# Patient Record
Sex: MALE | Race: BLACK OR AFRICAN AMERICAN | NOT HISPANIC OR LATINO | Employment: OTHER | URBAN - METROPOLITAN AREA
[De-identification: names, ages, dates, MRNs, and addresses within clinical notes are randomized per-mention and may not be internally consistent; named-entity substitution may affect disease eponyms.]

---

## 2023-05-02 LAB
APPEARANCE, URINE: CLEAR
BILIRUBIN, URINE: NEGATIVE
BLOOD, URINE: NEGATIVE
COLOR, URINE: YELLOW
GLUCOSE, URINE: NEGATIVE MG/DL
KETONES, URINE: NEGATIVE MG/DL
LEUKOCYTE ESTERASE, URINE: NEGATIVE
NITRITE, URINE: NEGATIVE
PH, URINE: 6 (ref 5–8)
PROTEIN, URINE: NEGATIVE MG/DL
SPECIFIC GRAVITY, URINE: 1.02 (ref 1–1.03)
UROBILINOGEN, URINE: 2 MG/DL (ref 0–1.9)

## 2023-05-03 LAB — URINE CULTURE: NO GROWTH

## 2024-08-23 DIAGNOSIS — R32 URINARY INCONTINENCE, UNSPECIFIED TYPE: Primary | ICD-10-CM

## 2024-08-26 RX ORDER — TAMSULOSIN HYDROCHLORIDE 0.4 MG/1
CAPSULE ORAL
Qty: 180 CAPSULE | Refills: 3 | Status: SHIPPED | OUTPATIENT
Start: 2024-08-26

## 2024-11-05 ENCOUNTER — HOSPITAL ENCOUNTER (INPATIENT)
Facility: HOSPITAL | Age: 69
LOS: 2 days | Discharge: HOME | End: 2024-11-07
Attending: EMERGENCY MEDICINE | Admitting: NURSE PRACTITIONER
Payer: MEDICARE

## 2024-11-05 ENCOUNTER — APPOINTMENT (OUTPATIENT)
Dept: RADIOLOGY | Facility: HOSPITAL | Age: 69
End: 2024-11-05
Payer: MEDICARE

## 2024-11-05 ENCOUNTER — CLINICAL SUPPORT (OUTPATIENT)
Dept: EMERGENCY MEDICINE | Facility: HOSPITAL | Age: 69
End: 2024-11-05
Payer: MEDICARE

## 2024-11-05 DIAGNOSIS — S52.601A CLOSED FRACTURE OF DISTAL ENDS OF RIGHT RADIUS AND ULNA, INITIAL ENCOUNTER: ICD-10-CM

## 2024-11-05 DIAGNOSIS — S52.501A CLOSED FRACTURE OF LOWER END OF RADIUS WITH ULNA, RIGHT, INITIAL ENCOUNTER: Primary | ICD-10-CM

## 2024-11-05 DIAGNOSIS — S52.601A CLOSED FRACTURE OF LOWER END OF RADIUS WITH ULNA, RIGHT, INITIAL ENCOUNTER: Primary | ICD-10-CM

## 2024-11-05 DIAGNOSIS — S52.501A CLOSED FRACTURE OF DISTAL ENDS OF RIGHT RADIUS AND ULNA, INITIAL ENCOUNTER: ICD-10-CM

## 2024-11-05 DIAGNOSIS — Z72.0 TOBACCO USE: ICD-10-CM

## 2024-11-05 PROBLEM — N40.1 BPH WITH OBSTRUCTION/LOWER URINARY TRACT SYMPTOMS: Status: ACTIVE | Noted: 2024-11-05

## 2024-11-05 PROBLEM — N13.8 BPH WITH OBSTRUCTION/LOWER URINARY TRACT SYMPTOMS: Status: ACTIVE | Noted: 2024-11-05

## 2024-11-05 LAB
ABO GROUP (TYPE) IN BLOOD: NORMAL
AMPHETAMINES UR QL SCN: ABNORMAL
ANION GAP SERPL CALC-SCNC: 18 MMOL/L (ref 10–20)
ANTIBODY SCREEN: NORMAL
APTT PPP: 27 SECONDS (ref 27–38)
ATRIAL RATE: 82 BPM
BARBITURATES UR QL SCN: ABNORMAL
BENZODIAZ UR QL SCN: ABNORMAL
BUN SERPL-MCNC: 16 MG/DL (ref 6–23)
BZE UR QL SCN: ABNORMAL
CALCIUM SERPL-MCNC: 8.8 MG/DL (ref 8.6–10.6)
CANNABINOIDS UR QL SCN: ABNORMAL
CHLORIDE SERPL-SCNC: 106 MMOL/L (ref 98–107)
CO2 SERPL-SCNC: 21 MMOL/L (ref 21–32)
CREAT SERPL-MCNC: 0.65 MG/DL (ref 0.5–1.3)
EGFRCR SERPLBLD CKD-EPI 2021: >90 ML/MIN/1.73M*2
ERYTHROCYTE [DISTWIDTH] IN BLOOD BY AUTOMATED COUNT: 14.6 % (ref 11.5–14.5)
ETHANOL SERPL-MCNC: <10 MG/DL
FENTANYL+NORFENTANYL UR QL SCN: ABNORMAL
GLUCOSE SERPL-MCNC: 155 MG/DL (ref 74–99)
HCT VFR BLD AUTO: 39.2 % (ref 41–52)
HGB BLD-MCNC: 13.4 G/DL (ref 13.5–17.5)
INR PPP: 0.9 (ref 0.9–1.1)
MCH RBC QN AUTO: 29.4 PG (ref 26–34)
MCHC RBC AUTO-ENTMCNC: 34.2 G/DL (ref 32–36)
MCV RBC AUTO: 86 FL (ref 80–100)
METHADONE UR QL SCN: ABNORMAL
NRBC BLD-RTO: 0 /100 WBCS (ref 0–0)
OPIATES UR QL SCN: ABNORMAL
OXYCODONE+OXYMORPHONE UR QL SCN: ABNORMAL
P AXIS: 62 DEGREES
P OFFSET: 202 MS
P ONSET: 137 MS
PCP UR QL SCN: ABNORMAL
PLATELET # BLD AUTO: 268 X10*3/UL (ref 150–450)
POTASSIUM SERPL-SCNC: 4.1 MMOL/L (ref 3.5–5.3)
PR INTERVAL: 142 MS
PROTHROMBIN TIME: 10.2 SECONDS (ref 9.8–12.8)
Q ONSET: 208 MS
QRS COUNT: 14 BEATS
QRS DURATION: 100 MS
QT INTERVAL: 344 MS
QTC CALCULATION(BAZETT): 401 MS
QTC FREDERICIA: 381 MS
R AXIS: -25 DEGREES
RBC # BLD AUTO: 4.56 X10*6/UL (ref 4.5–5.9)
RH FACTOR (ANTIGEN D): NORMAL
SODIUM SERPL-SCNC: 141 MMOL/L (ref 136–145)
T AXIS: 57 DEGREES
T OFFSET: 380 MS
TSH SERPL-ACNC: 1.65 MIU/L (ref 0.44–3.98)
VENTRICULAR RATE: 82 BPM
WBC # BLD AUTO: 7.5 X10*3/UL (ref 4.4–11.3)

## 2024-11-05 PROCEDURE — 72125 CT NECK SPINE W/O DYE: CPT | Performed by: RADIOLOGY

## 2024-11-05 PROCEDURE — 36415 COLL VENOUS BLD VENIPUNCTURE: CPT

## 2024-11-05 PROCEDURE — 2500000001 HC RX 250 WO HCPCS SELF ADMINISTERED DRUGS (ALT 637 FOR MEDICARE OP): Performed by: NURSE PRACTITIONER

## 2024-11-05 PROCEDURE — 96374 THER/PROPH/DIAG INJ IV PUSH: CPT

## 2024-11-05 PROCEDURE — 71045 X-RAY EXAM CHEST 1 VIEW: CPT

## 2024-11-05 PROCEDURE — 1210000001 HC SEMI-PRIVATE ROOM DAILY

## 2024-11-05 PROCEDURE — 85610 PROTHROMBIN TIME: CPT

## 2024-11-05 PROCEDURE — 96375 TX/PRO/DX INJ NEW DRUG ADDON: CPT

## 2024-11-05 PROCEDURE — 80048 BASIC METABOLIC PNL TOTAL CA: CPT

## 2024-11-05 PROCEDURE — 2500000004 HC RX 250 GENERAL PHARMACY W/ HCPCS (ALT 636 FOR OP/ED)

## 2024-11-05 PROCEDURE — 72125 CT NECK SPINE W/O DYE: CPT

## 2024-11-05 PROCEDURE — 85027 COMPLETE CBC AUTOMATED: CPT

## 2024-11-05 PROCEDURE — 70450 CT HEAD/BRAIN W/O DYE: CPT | Performed by: RADIOLOGY

## 2024-11-05 PROCEDURE — 73090 X-RAY EXAM OF FOREARM: CPT | Mod: RIGHT SIDE | Performed by: RADIOLOGY

## 2024-11-05 PROCEDURE — 80307 DRUG TEST PRSMV CHEM ANLYZR: CPT

## 2024-11-05 PROCEDURE — G0378 HOSPITAL OBSERVATION PER HR: HCPCS

## 2024-11-05 PROCEDURE — 99223 1ST HOSP IP/OBS HIGH 75: CPT | Performed by: NURSE PRACTITIONER

## 2024-11-05 PROCEDURE — 84443 ASSAY THYROID STIM HORMONE: CPT | Performed by: NURSE PRACTITIONER

## 2024-11-05 PROCEDURE — 93010 ELECTROCARDIOGRAM REPORT: CPT

## 2024-11-05 PROCEDURE — 73090 X-RAY EXAM OF FOREARM: CPT | Mod: RT

## 2024-11-05 PROCEDURE — 2500000002 HC RX 250 W HCPCS SELF ADMINISTERED DRUGS (ALT 637 FOR MEDICARE OP, ALT 636 FOR OP/ED): Performed by: NURSE PRACTITIONER

## 2024-11-05 PROCEDURE — 82077 ASSAY SPEC XCP UR&BREATH IA: CPT | Performed by: NURSE PRACTITIONER

## 2024-11-05 PROCEDURE — 73070 X-RAY EXAM OF ELBOW: CPT | Mod: RIGHT SIDE | Performed by: RADIOLOGY

## 2024-11-05 PROCEDURE — 93005 ELECTROCARDIOGRAM TRACING: CPT

## 2024-11-05 PROCEDURE — 73100 X-RAY EXAM OF WRIST: CPT | Mod: RIGHT SIDE | Performed by: RADIOLOGY

## 2024-11-05 PROCEDURE — 73070 X-RAY EXAM OF ELBOW: CPT | Mod: RT

## 2024-11-05 PROCEDURE — 70450 CT HEAD/BRAIN W/O DYE: CPT

## 2024-11-05 PROCEDURE — 86901 BLOOD TYPING SEROLOGIC RH(D): CPT

## 2024-11-05 PROCEDURE — 99285 EMERGENCY DEPT VISIT HI MDM: CPT | Performed by: EMERGENCY MEDICINE

## 2024-11-05 PROCEDURE — 73100 X-RAY EXAM OF WRIST: CPT | Mod: RT

## 2024-11-05 PROCEDURE — 99285 EMERGENCY DEPT VISIT HI MDM: CPT | Mod: 25

## 2024-11-05 RX ORDER — FENTANYL CITRATE 50 UG/ML
100 INJECTION, SOLUTION INTRAMUSCULAR; INTRAVENOUS ONCE
Status: COMPLETED | OUTPATIENT
Start: 2024-11-05 | End: 2024-11-05

## 2024-11-05 RX ORDER — AMOXICILLIN 250 MG
2 CAPSULE ORAL 2 TIMES DAILY
Status: DISCONTINUED | OUTPATIENT
Start: 2024-11-05 | End: 2024-11-07 | Stop reason: HOSPADM

## 2024-11-05 RX ORDER — OXYCODONE HYDROCHLORIDE 5 MG/1
5 TABLET ORAL EVERY 6 HOURS PRN
Status: DISCONTINUED | OUTPATIENT
Start: 2024-11-05 | End: 2024-11-05

## 2024-11-05 RX ORDER — TAMSULOSIN HYDROCHLORIDE 0.4 MG/1
0.8 CAPSULE ORAL NIGHTLY
Status: DISCONTINUED | OUTPATIENT
Start: 2024-11-05 | End: 2024-11-07 | Stop reason: HOSPADM

## 2024-11-05 RX ORDER — LIDOCAINE HYDROCHLORIDE AND EPINEPHRINE 10; 10 MG/ML; UG/ML
INJECTION, SOLUTION INFILTRATION; PERINEURAL
Status: DISPENSED
Start: 2024-11-05 | End: 2024-11-06

## 2024-11-05 RX ORDER — OXYCODONE HYDROCHLORIDE 5 MG/1
10 TABLET ORAL EVERY 6 HOURS PRN
Status: DISCONTINUED | OUTPATIENT
Start: 2024-11-05 | End: 2024-11-07 | Stop reason: HOSPADM

## 2024-11-05 RX ORDER — FINASTERIDE 5 MG/1
5 TABLET, FILM COATED ORAL DAILY
COMMUNITY

## 2024-11-05 RX ORDER — IBUPROFEN 200 MG
1 TABLET ORAL DAILY PRN
Status: DISCONTINUED | OUTPATIENT
Start: 2024-11-05 | End: 2024-11-07 | Stop reason: HOSPADM

## 2024-11-05 RX ORDER — FINASTERIDE 5 MG/1
5 TABLET, FILM COATED ORAL DAILY
Status: DISCONTINUED | OUTPATIENT
Start: 2024-11-05 | End: 2024-11-07 | Stop reason: HOSPADM

## 2024-11-05 RX ORDER — SILDENAFIL 100 MG/1
100 TABLET, FILM COATED ORAL AS NEEDED
COMMUNITY

## 2024-11-05 RX ORDER — FINASTERIDE 5 MG/1
5 TABLET, FILM COATED ORAL DAILY
Status: DISCONTINUED | OUTPATIENT
Start: 2024-11-06 | End: 2024-11-05

## 2024-11-05 RX ORDER — AMLODIPINE BESYLATE 5 MG/1
5 TABLET ORAL DAILY
Status: DISCONTINUED | OUTPATIENT
Start: 2024-11-05 | End: 2024-11-05

## 2024-11-05 RX ORDER — ACETAMINOPHEN 325 MG/1
650 TABLET ORAL EVERY 6 HOURS PRN
Status: DISCONTINUED | OUTPATIENT
Start: 2024-11-05 | End: 2024-11-07 | Stop reason: HOSPADM

## 2024-11-05 RX ORDER — MORPHINE SULFATE 4 MG/ML
2 INJECTION INTRAVENOUS ONCE
Status: COMPLETED | OUTPATIENT
Start: 2024-11-05 | End: 2024-11-05

## 2024-11-05 RX ORDER — FENTANYL CITRATE 50 UG/ML
INJECTION, SOLUTION INTRAMUSCULAR; INTRAVENOUS
Status: COMPLETED
Start: 2024-11-05 | End: 2024-11-05

## 2024-11-05 RX ORDER — OXYCODONE HYDROCHLORIDE 5 MG/1
10 TABLET ORAL EVERY 6 HOURS PRN
Status: DISCONTINUED | OUTPATIENT
Start: 2024-11-05 | End: 2024-11-05

## 2024-11-05 RX ORDER — AMLODIPINE BESYLATE 5 MG/1
5 TABLET ORAL DAILY
Status: DISCONTINUED | OUTPATIENT
Start: 2024-11-06 | End: 2024-11-07 | Stop reason: HOSPADM

## 2024-11-05 RX ORDER — LIDOCAINE HYDROCHLORIDE 10 MG/ML
20 INJECTION, SOLUTION INFILTRATION; PERINEURAL ONCE
Status: COMPLETED | OUTPATIENT
Start: 2024-11-05 | End: 2024-11-05

## 2024-11-05 RX ORDER — ACETAMINOPHEN 500 MG
5 TABLET ORAL NIGHTLY PRN
Status: DISCONTINUED | OUTPATIENT
Start: 2024-11-05 | End: 2024-11-07 | Stop reason: HOSPADM

## 2024-11-05 RX ORDER — OXYCODONE HYDROCHLORIDE 5 MG/1
5 TABLET ORAL EVERY 6 HOURS PRN
Status: DISCONTINUED | OUTPATIENT
Start: 2024-11-05 | End: 2024-11-07 | Stop reason: HOSPADM

## 2024-11-05 ASSESSMENT — ENCOUNTER SYMPTOMS
WEAKNESS: 0
NAUSEA: 0
VOMITING: 0
DIFFICULTY URINATING: 0
SHORTNESS OF BREATH: 0
CHILLS: 0
ABDOMINAL PAIN: 0
HEADACHES: 0
COUGH: 0
DIARRHEA: 0
CONFUSION: 0
FEVER: 0
DIZZINESS: 0

## 2024-11-05 ASSESSMENT — PAIN - FUNCTIONAL ASSESSMENT
PAIN_FUNCTIONAL_ASSESSMENT: 0-10

## 2024-11-05 ASSESSMENT — PAIN SCALES - GENERAL
PAINLEVEL_OUTOF10: 10 - WORST POSSIBLE PAIN
PAINLEVEL_OUTOF10: 6
PAINLEVEL_OUTOF10: 3
PAINLEVEL_OUTOF10: 4
PAINLEVEL_OUTOF10: 8

## 2024-11-05 ASSESSMENT — PAIN DESCRIPTION - PROGRESSION
CLINICAL_PROGRESSION: NOT CHANGED
CLINICAL_PROGRESSION: RAPIDLY IMPROVING

## 2024-11-05 ASSESSMENT — PAIN DESCRIPTION - ORIENTATION: ORIENTATION: RIGHT

## 2024-11-05 ASSESSMENT — PAIN DESCRIPTION - LOCATION: LOCATION: ARM

## 2024-11-05 NOTE — H&P
History Of Present Illness  Grover Bauer is a 69 y.o. male with a PMH of cocaine abuse, tobacco use disorder, and BPH. Mr. Bauer presented to ED via EMS for further evaluation after he fell 12 ft off a ladder. Pt had noted deformity of right forearm with abrasions present on his right lower leg and abdomen. Splint was placed to right arm in field by EMS. He also endorses cocaine use yesterday. Pt resting comfortably when seen by writer in ED. Pt told writer that he feels much better than he did upon arrival to ED. He currently endorses 2/10 pain in right arm which is tolerable per his report. Labs obtained on admit notable for mild anemia. Imaging obtained while in the ED showed midshaft both-bone forearm fracture with displacement and overriding. 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 Ortho consulted and pt seen by consulting team while in ED. Plan is for pt to go OR in AM for surgical intervention. Pt admitted to medicine service for closed fracture of lower end of radius with ulna.    Past Medical History  Past Medical History:   Diagnosis Date    BPH with obstruction/lower urinary tract symptoms     Cocaine abuse        Surgical History  Past Surgical History:   Procedure Laterality Date    OTHER SURGICAL HISTORY  01/13/2015    Needle Biopsy Of Prostate        Social History  He reports that he has been smoking cigarettes. He has never used smokeless tobacco. He reports that he does not currently use alcohol. He reports current drug use. Drug: Cocaine.    Family History  Family History   Family history unknown: Yes        Allergies  Patient has no known allergies.    Review of Systems   Constitutional:  Negative for chills and fever.   Respiratory:  Negative for cough and shortness of breath.    Cardiovascular:  Negative for chest pain and leg  swelling.   Gastrointestinal:  Negative for abdominal pain, diarrhea, nausea and vomiting.   Genitourinary:  Negative for difficulty urinating.   Musculoskeletal:         Right arm pain   Neurological:  Negative for dizziness, weakness and headaches.   Psychiatric/Behavioral:  Negative for confusion.         Physical Exam  Vitals reviewed.   Constitutional:       General: He is not in acute distress.  HENT:      Head: Normocephalic.      Nose: Nose normal.      Mouth/Throat:      Mouth: Mucous membranes are dry.      Comments: Poor dentition  Eyes:      Extraocular Movements: Extraocular movements intact.   Cardiovascular:      Rate and Rhythm: Normal rate.      Pulses: Normal pulses.      Heart sounds: Normal heart sounds.   Pulmonary:      Effort: Pulmonary effort is normal. No respiratory distress.      Breath sounds: Normal breath sounds.   Abdominal:      General: Bowel sounds are normal.      Palpations: Abdomen is soft.   Musculoskeletal:      Cervical back: Normal range of motion.      Comments: Splint on R arm (fingers warm to touch, able to wiggle fingers)   Skin:     General: Skin is warm and dry.   Neurological:      Mental Status: He is alert and oriented to person, place, and time.          Last Recorded Vitals  Blood pressure (!) 143/94, pulse 88, weight 109 kg (240 lb), SpO2 96%.    Relevant Results  CT cervical spine wo IV contrast    Result Date: 11/5/2024  Interpreted By:  Carine Noyola and Sheng Max STUDY: CT HEAD WO IV CONTRAST; CT CERVICAL SPINE WO IV CONTRAST;  11/5/2024 1:36 pm   INDICATION: Signs/Symptoms:fall. Fell off [employer] ladder. No LOC. No head or neck pain.     COMPARISON: CT head and cervical spine 06/20/2023 from outside hospital, report available images not available.   ACCESSION NUMBER(S): [id number]; [id number]   ORDERING CLINICIAN: CLEM PETIT   TECHNIQUE: Axial noncontrast CT images of head with coronal and sagittal reconstructed images. Axial noncontrast CT images of  the cervical spine with coronal and sagittal reconstructed images.   FINDINGS: CT HEAD:   BRAIN PARENCHYMA: No evidence of acute intraparenchymal hemorrhage or parenchymal evidence of acute large territory ischemic infarct. No mass-effect, midline shift or effacement of cerebral sulci. Gray-white matter distinction is preserved. Mild hypoattenuation in the bilateral periventricular white matter and centrum semiovale are non-specific may be sequela of chronic small-vessel ischemic changes given patient's age.   VENTRICLES and EXTRA-AXIAL SPACES: No acute extra-axial or intraventricular hemorrhage. Ventricles and sulci are age-concordant.   PARANASAL SINUSES/MASTOIDS: Mild mucosal thickening in the bilateral anterior ethmoid air cells and right maxillary sinus. The remaining paranasal sinuses and mastoid air cells are well-aerated. Anatomic variant of torus palatini.   CALVARIUM/ORBITS: No skull fracture. The orbits and globes are intact to the extent visualized.   EXTRACRANIAL SOFT TISSUES: No discernible abnormality.     CT Cervical Spine:   PREVERTEBRAL SOFT TISSUES: Within normal limits.   CRANIOCERVICAL JUNCTION: Intact.   ALIGNMENT: No traumatic malalignment or traumatic facet widening.   VERTEBRAE: No acute fracture. Vertebral body heights are maintained.   SPINAL CANAL/INTERVERTEBRAL DISCS: No high-grade spinal canal stenosis. No significant disc height loss.   NEURAL FORAMINA: No significant neural foraminal stenosis.   OTHER: Calcifications in the right palatine tonsil may be secondary to remote infection/inflammation. There is heterogenous enlargement of the thyroid gland which can be seen in multinodular goiter. Minimal pleural-parenchymal scarring in the bilateral lung apices.   Fat density mass within the posterior neck on the right measuring 6.7 x 4.0 cm likely represents a lipoma.       CT HEAD: 1. No acute intracranial hemorrhage or depressed calvarial fracture. 2. Mild white matter hypodensity  compatible with microangiopathy.     CT CERVICAL SPINE: 1. No acute fracture or traumatic malalignment of the cervical spine. 2. Heterogenous enlargement of the thyroid gland. Further evaluation with non-emergent thyroid ultrasound on an outpatient basis may be obtained as per clinical need. 3. Fat density mass within the posterior neck on the right likely represents a lipoma measuring 6.7 x 4.0 cm.     I personally reviewed the images/study and I agree with the findings as stated by Dr. Rubens Quach. This study was interpreted at [place], [place].   MACRO: None.   Signed by: Carine Noyola 11/5/2024 2:00 PM Dictation workstation:   [id number]    CT head wo IV contrast    Result Date: 11/5/2024  Interpreted By:  Carine Noyola and Sheng Max STUDY: CT HEAD WO IV CONTRAST; CT CERVICAL SPINE WO IV CONTRAST;  11/5/2024 1:36 pm   INDICATION: Signs/Symptoms:fall. Fell off [employer]elfa ladder. No LOC. No head or neck pain.     COMPARISON: CT head and cervical spine 06/20/2023 from outside hospital, report available images not available.   ACCESSION NUMBER(S): [id number]; [id number]   ORDERING CLINICIAN: CLEM PETIT   TECHNIQUE: Axial noncontrast CT images of head with coronal and sagittal reconstructed images. Axial noncontrast CT images of the cervical spine with coronal and sagittal reconstructed images.   FINDINGS: CT HEAD:   BRAIN PARENCHYMA: No evidence of acute intraparenchymal hemorrhage or parenchymal evidence of acute large territory ischemic infarct. No mass-effect, midline shift or effacement of cerebral sulci. Gray-white matter distinction is preserved. Mild hypoattenuation in the bilateral periventricular white matter and centrum semiovale are non-specific may be sequela of chronic small-vessel ischemic changes given patient's age.   VENTRICLES and EXTRA-AXIAL SPACES: No acute extra-axial or intraventricular hemorrhage. Ventricles and sulci are age-concordant.    PARANASAL SINUSES/MASTOIDS: Mild mucosal thickening in the bilateral anterior ethmoid air cells and right maxillary sinus. The remaining paranasal sinuses and mastoid air cells are well-aerated. Anatomic variant of torus palatini.   CALVARIUM/ORBITS: No skull fracture. The orbits and globes are intact to the extent visualized.   EXTRACRANIAL SOFT TISSUES: No discernible abnormality.     CT Cervical Spine:   PREVERTEBRAL SOFT TISSUES: Within normal limits.   CRANIOCERVICAL JUNCTION: Intact.   ALIGNMENT: No traumatic malalignment or traumatic facet widening.   VERTEBRAE: No acute fracture. Vertebral body heights are maintained.   SPINAL CANAL/INTERVERTEBRAL DISCS: No high-grade spinal canal stenosis. No significant disc height loss.   NEURAL FORAMINA: No significant neural foraminal stenosis.   OTHER: Calcifications in the right palatine tonsil may be secondary to remote infection/inflammation. There is heterogenous enlargement of the thyroid gland which can be seen in multinodular goiter. Minimal pleural-parenchymal scarring in the bilateral lung apices.   Fat density mass within the posterior neck on the right measuring 6.7 x 4.0 cm likely represents a lipoma.       CT HEAD: 1. No acute intracranial hemorrhage or depressed calvarial fracture. 2. Mild white matter hypodensity compatible with microangiopathy.     CT CERVICAL SPINE: 1. No acute fracture or traumatic malalignment of the cervical spine. 2. Heterogenous enlargement of the thyroid gland. Further evaluation with non-emergent thyroid ultrasound on an outpatient basis may be obtained as per clinical need. 3. Fat density mass within the posterior neck on the right likely represents a lipoma measuring 6.7 x 4.0 cm.     I personally reviewed the images/study and I agree with the findings as stated by Dr. Rubens Quach. This study was interpreted at [facility], [place], [place].   MACRO: None.   Signed by: Carine Noyola 11/5/2024  2:00 PM Dictation workstation:   [id number]    XR chest 1 view    Result Date: 11/5/2024  Interpreted By:  Vinicius Farmer, STUDY: XR CHEST 1 VIEW   INDICATION: Signs/Symptoms:pre-op.   COMPARISON: None   ACCESSION NUMBER(S): [id number]   ORDERING CLINICIAN: CLEM PETIT   FINDINGS:     No consolidation, effusion, edema, or pneumothorax. Heart size within normal limits given supine AP positioning.         No evidence of acute intrathoracic abnormality.   Signed by: Vinicius Farmer 11/5/2024 1:40 PM Dictation workstation:   [id number]    XR forearm right 2 views    Result Date: 11/5/2024  Interpreted By:  Vinicius Farmer, STUDY: XR FOREARM RIGHT 2 VIEWS; XR ELBOW RIGHT 1-2 VIEWS; XR WRIST RIGHT 1-2 VIEWS   INDICATION: Signs/Symptoms:fall.   COMPARISON: None   ACCESSION NUMBER(S): [id number]; [id number]; [id number]   ORDERING CLINICIAN: CLEM PETIT   FINDINGS: Displaced and overriding midshaft both-bone forearm fracture right forearm.   Additional nondisplaced fracture right distal ulnar diaphysis.   Carpal alignment normal.   No acute findings of the elbow.         Midshaft both-bone forearm fracture with displacement and overriding.   Additional nondisplaced distal ulnar diaphyseal fracture.   Signed by: Vinicius Farmer 11/5/2024 1:39 PM Dictation workstation:   [id number]    XR elbow right 1-2 views    Result Date: 11/5/2024  Interpreted By:  Vinicius Farmer, STUDY: XR FOREARM RIGHT 2 VIEWS; XR ELBOW RIGHT 1-2 VIEWS; XR WRIST RIGHT 1-2 VIEWS   INDICATION: Signs/Symptoms:fall.   COMPARISON: None   ACCESSION NUMBER(S): [id number]; [id number]; [id number]   ORDERING CLINICIAN: CLEM PETIT   FINDINGS: Displaced and overriding midshaft both-bone forearm fracture right forearm.   Additional nondisplaced fracture right distal ulnar diaphysis.   Carpal alignment normal.   No acute findings of the elbow.         Midshaft both-bone forearm fracture with displacement and overriding.   Additional nondisplaced distal ulnar  diaphyseal fracture.   Signed by: Vinicius Farmer 11/5/2024 1:39 PM Dictation workstation:   [id number]    XR wrist right 1-2 views    Result Date: 11/5/2024  Interpreted By:  Vinicius Farmer, STUDY: XR FOREARM RIGHT 2 VIEWS; XR ELBOW RIGHT 1-2 VIEWS; XR WRIST RIGHT 1-2 VIEWS   INDICATION: Signs/Symptoms:fall.   COMPARISON: None   ACCESSION NUMBER(S): [id number]; [id number]; [id number]   ORDERING CLINICIAN: CLEM PETIT   FINDINGS: Displaced and overriding midshaft both-bone forearm fracture right forearm.   Additional nondisplaced fracture right distal ulnar diaphysis.   Carpal alignment normal.   No acute findings of the elbow.         Midshaft both-bone forearm fracture with displacement and overriding.   Additional nondisplaced distal ulnar diaphyseal fracture.   Signed by: Vinicius Farmer 11/5/2024 1:39 PM Dictation workstation:   [id number]      Results for orders placed or performed during the hospital encounter of 11/05/24 (from the past 24 hours)   CBC   Result Value Ref Range    WBC 7.5 4.4 - 11.3 x10*3/uL    nRBC 0.0 0.0 - 0.0 /100 WBCs    RBC 4.56 4.50 - 5.90 x10*6/uL    Hemoglobin 13.4 (L) 13.5 - 17.5 g/dL    Hematocrit 39.2 (L) 41.0 - 52.0 %    MCV 86 80 - 100 fL    MCH 29.4 26.0 - 34.0 pg    MCHC 34.2 32.0 - 36.0 g/dL    RDW 14.6 (H) 11.5 - 14.5 %    Platelets 268 150 - 450 x10*3/uL   Basic metabolic panel   Result Value Ref Range    Glucose 155 (H) 74 - 99 mg/dL    Sodium 141 136 - 145 mmol/L    Potassium 4.1 3.5 - 5.3 mmol/L    Chloride 106 98 - 107 mmol/L    Bicarbonate 21 21 - 32 mmol/L    Anion Gap 18 10 - 20 mmol/L    Urea Nitrogen 16 6 - 23 mg/dL    Creatinine 0.65 0.50 - 1.30 mg/dL    eGFR >90 >60 mL/min/1.73m*2    Calcium 8.8 8.6 - 10.6 mg/dL   Coagulation Screen   Result Value Ref Range    Protime 10.2 9.8 - 12.8 seconds    INR 0.9 0.9 - 1.1    aPTT 27 27 - 38 seconds   Type and Screen   Result Value Ref Range    ABO TYPE O     Rh TYPE POS     ANTIBODY SCREEN NEG         ED Medication  Administration from 11/05/2024 1147 to 11/05/2024 1700         Date/Time Order Dose Route Action Action by     11/05/2024 1218 EST fentaNYL PF (Sublimaze) injection 100 mcg 100 mcg intravenous Given NATALIIA Underwood     11/05/2024 1518 EST morphine injection 2 mg 2 mg intravenous Given NATALIIA Underwood           Scheduled medications  [START ON 11/6/2024] finasteride, 5 mg, oral, Daily  lidocaine, 20 mL, subcutaneous, Once  lidocaine-epinephrine, , ,   sennosides-docusate sodium, 2 tablet, oral, BID  tamsulosin, 0.8 mg, oral, Nightly      Continuous medications     PRN medications  PRN medications: acetaminophen, lidocaine-epinephrine, melatonin, oxyCODONE, oxyCODONE       Assessment/Plan     #closed fracture of lower end of radius with ulna  - Xray imaging of R wrist/elbow/forearm: Midshaft both-bone forearm fracture with displacement and overriding.  Additional nondisplaced distal ulnar diaphyseal fracture.  - ortho consulted appreciate recs  - Pre-op labs/tests ordered: CXR, EKG, CBC, Coag screen, BMP, T&S  - Pain regimen: Tylenol 650 mg q6h PRN mild pain, Oxycodone 5 mg q6h PRN moderate pain, and Oxycodone 10 mg PO q6h PRN severe pain  - Bowel regimen while receiving narcotics: Senokot S 2 tabs PO BID  - falls precautions  - PT/OT consulted  - NPO at MN 11/6 except meds with sips of water for surgical intervention with ortho in AM  Surgical Risk Assessment:  1) Patient doesn't have contraindications to elective surgery.  2) Surgery-Specific Cardiac Risk: Low risk (<1% )   3) Patient-Specific Cardiac Risk:  RCRI 0 points class I risk, 3.9% 30-day risk of major adverse cardiac event. This also corresponds to a low-risk of major perioperative cardiovascular events.  4) The 2014 ACC/AHA algorithm was followed and Labs and imaging studies have been considered as indicated by history and physical.  The procedure, alternative treatments, risks, and benefits were explained to the patient. The risk of MACE in the perioperative period  was explained to the patient.  Chest x-ray without any evidence of pulmonary edema or focal consolidations.  No evidence of coagulopathy on coagulation panel.This patient is low risk for a low risk procedure and is at this time medically optimized for surgical intervention.    #abnormal CT finding  - CTH/c-spine:heterogenous enlargement of the thyroid gland. Further evaluation with non-emergent thyroid ultrasound on an outpatient basis may be obtained as per clinical need. Fat density mass within the posterior neck on the right likely represents a lipoma measuring 6.7 x 4.0 cm.  - TSH with reflex to free T4 pending result  - pt will need to US of thyroid scheduled on discharge and follow-up with PCP    #HTN  - no home meds  - BP on admit 160/94; last /94  - elevation may be 2/2 pain or cocaine use  - start low dose Norvasc 5 mg PO every day    #BPH with LUTS  - c/w home dose of Proscar 5 mg PO every day and Flomax 0.8 mg PO at bedtime  - pt will need refill on Proscar at discharge; pt reports he was told by urology that he needs to schedule follow up appt before refill can be given    #cocaine abuse  #nicotine use disorder  - Utox pending collection  - Utox + cocaine and marijuana on 6/21/23  - smoking and cocaine cessation advised and recommended  - Nicotine patch ordered PRN    Dispo: admit to RNF. Plan per above.      I spent 75 minutes in the professional and overall care of this patient.      Alicia Mccollum, APRN-CNP

## 2024-11-05 NOTE — ED PROVIDER NOTES
History of Present Illness     History provided by: Patient  Limitations to History: None  External Records Reviewed with Brief Summary: None    HPI:  Grover Bauer is a 69 y.o. male with pmh of BPH who presents after falling from a ladder. Patient reports he was on a 12 foot ladder when he fell back bracing himself with his right forearm. There was a splint applied on on the field. There is no concern for an open fracture. Patient denies head strike or loss of consciousness. Patient arrived via EMS.     Physical Exam   Triage vitals:  T    HR 88  BP (!) 160/94  RR    O2 97 % None (Room air)    Physical Exam  HENT:      Head: Normocephalic and atraumatic.   Neck:      Comments: Cervical collar in place  Cardiovascular:      Rate and Rhythm: Normal rate and regular rhythm.   Pulmonary:      Effort: Pulmonary effort is normal.      Breath sounds: Normal breath sounds.   Abdominal:      Palpations: Abdomen is soft.   Musculoskeletal:         General: Signs of injury present.      Right forearm: Swelling, deformity, tenderness and bony tenderness present. No lacerations.      Left forearm: Normal.      Cervical back: Normal. No deformity or tenderness.      Thoracic back: Normal. No deformity or tenderness.      Lumbar back: Normal. No deformity or tenderness.      Comments: Right forearm has an obvious deformity. No open wounds or lacerations. 4/5 Strength AIN, PIN, Ulnar. Sensation intact diffusely throughout the fingers. 2+ radial pulse.    Skin:     General: Skin is warm and dry.   Neurological:      General: No focal deficit present.      Mental Status: He is alert and oriented to person, place, and time.          Medical Decision Making & ED Course   Medical Decision Makin y.o. male who sustained a fall from a ladder now presenting with right forearm pain and deformity. With patient falling from height CT head and C spine were ordered there was no concern for headstrike or LoC. CT Head and C-spine  demonstrated no acute pathologies but patient was found to have an enlarged thyroid and what appeared to be a lipoma at the posterior neck.  ere was no concern for headstrike or LoC. Radiographs demonstrate a right forearm both bone forearm fracture. Orthopedics consulted and they recommended admission for operative management. Patient admits to drug use with past use of cocaine yesterday with this information orthopedics requested an admission to medicine.  ----      Differential diagnoses considered include but are not limited to: fracture, dislocation, compartment syndrome,      Social Determinants of Health which Significantly Impact Care: Substance use disorder     EKG Independent Interpretation: EKG interpreted by myself. Please see ED Course for full interpretation.    Independent Result Review and Interpretation: Relevant laboratory and radiographic results were reviewed and independently interpreted by myself.  As necessary, they are commented on in the ED Course.    Chronic conditions affecting the patient's care: As documented above in [facility]    The patient was discussed with the following consultants/services: Orthopedic surgery regarding operative management     Care Considerations: As documented above in [facility]    ED Course:  Diagnoses as of 11/05/24 1700   Closed fracture of lower end of radius with ulna, right, initial encounter     Disposition   As a result of their workup, the patient will require admission to the hospital.  The patient was informed of his diagnosis.  The patient was given the opportunity to ask questions and I answered them. The patient agreed to be admitted to the hospital.    Procedures   Procedures    Patient seen and discussed with ED attending physician.    Kath Dia MD  Emergency Medicine     Kath Dia MD  Resident  11/05/24 1700

## 2024-11-05 NOTE — ED TRIAGE NOTES
PT REPORTS BY [facility] AFTER FALLING 12 FT OFF LADDER. PT DENIES LOC, THINNERS, HEAD, NECK PAIN. PT REPORTS MECHANICAL FALL, HAS NOTED DEFORMITY TO RIGHT FOREARM, ABRASION TO RIGHT LOWER LEG AND ABRASION TO ABD. PT HAS NORMAL PULSE IN AFFECTED ARM, CAP REFILL WNL.

## 2024-11-05 NOTE — CONSULTS
Orthopaedic Surgery Consult H&P    HPI:   Orthopaedic Problems/Injuries: R Both bone forearm fracture  Other Injuries: None    69 y.o. male PMH BPH presents after falling from a ladder onto an outstretched right arm. The patient denies losing consciousness, and notes that his arm was immediately deformed when he looked down. Denies any openings and denies any other injuries. Denies numbness, tingling, and open wounds on the affected limb.     PMH: per above/EMR  PSH: per above/EMR  SocHx:      - Regular tobacco use      - Occasional EtOH use      - Regular cocaine use, last used cocaine 11/4 pm  FamHx:  Non-contributory to this patient's acute orthopaedic problem.   Allergies: Reviewed in EMR  Meds: Reviewed in EMR    ROS      - 14 point ROS negative except as above    Physical Exam:  Gen: AOx3, NAD  HEENT: normocephalic atraumatic  Psych: appropriate mood and affect  Resp: nonlabored breathing  Cardiac: Extremities WWP, RRR to peripheral palpation  Neuro: CN 2-12 grossly intact  Skin: no rashes    Right upper extremity.  RUE:   - Skin intact, gross deformity, notable swelling. Tender at site of injury with painful ROM.  -Motor intact in axillary/AIN/PIN/ulnar distributions  -SILT axillary/radial/median/ulnar distributions  -Hand wwp, 2+ radial pulse, cap refill brisk  -Compartments soft and compressible, no pain with passive stretch of digits      A full secondary exam was performed and all relevant findings discussed and noted above.    Results for orders placed or performed during the hospital encounter of 11/05/24 (from the past 24 hours)   CBC   Result Value Ref Range    WBC 7.5 4.4 - 11.3 x10*3/uL    nRBC 0.0 0.0 - 0.0 /100 WBCs    RBC 4.56 4.50 - 5.90 x10*6/uL    Hemoglobin 13.4 (L) 13.5 - 17.5 g/dL    Hematocrit 39.2 (L) 41.0 - 52.0 %    MCV 86 80 - 100 fL    MCH 29.4 26.0 - 34.0 pg    MCHC 34.2 32.0 - 36.0 g/dL    RDW 14.6 (H) 11.5 - 14.5 %    Platelets 268 150 - 450 x10*3/uL   Basic metabolic panel    Result Value Ref Range    Glucose 155 (H) 74 - 99 mg/dL    Sodium 141 136 - 145 mmol/L    Potassium 4.1 3.5 - 5.3 mmol/L    Chloride 106 98 - 107 mmol/L    Bicarbonate 21 21 - 32 mmol/L    Anion Gap 18 10 - 20 mmol/L    Urea Nitrogen 16 6 - 23 mg/dL    Creatinine 0.65 0.50 - 1.30 mg/dL    eGFR >90 >60 mL/min/1.73m*2    Calcium 8.8 8.6 - 10.6 mg/dL   Coagulation Screen   Result Value Ref Range    Protime 10.2 9.8 - 12.8 seconds    INR 0.9 0.9 - 1.1    aPTT 27 27 - 38 seconds   Type and Screen   Result Value Ref Range    ABO TYPE O     Rh TYPE POS     ANTIBODY SCREEN NEG        XR forearm right 2 views   Final Result        Interval reduction right both-bone forearm fractures. The angulation   is improved but there is still displacement and overriding of both   the radius and ulna.        Nondisplaced distal ulnar diaphyseal fracture not as well seen as on   the earlier radiographs.        Signed by: Vinicius Farmer 11/5/2024 5:02 PM   Dictation workstation:   [id number]      XR elbow right 1-2 views   Final Result        Interval reduction right both-bone forearm fractures. The angulation   is improved but there is still displacement and overriding of both   the radius and ulna.        Nondisplaced distal ulnar diaphyseal fracture not as well seen as on   the earlier radiographs.        Signed by: Vinicius Farmer 11/5/2024 5:02 PM   Dictation workstation:   [id number]      CT cervical spine wo IV contrast   Final Result   CT HEAD:   1. No acute intracranial hemorrhage or depressed calvarial fracture.   2. Mild white matter hypodensity compatible with microangiopathy.             CT CERVICAL SPINE:   1. No acute fracture or traumatic malalignment of the cervical spine.   2. Heterogenous enlargement of the thyroid gland. Further evaluation   with non-emergent thyroid ultrasound on an outpatient basis may be   obtained as per clinical need.   3. Fat density mass within the posterior neck on the right likely   represents a  lipoma measuring 6.7 x 4.0 cm.             I personally reviewed the images/study and I agree with the findings   as stated by Dr. Rubens Quach. This study was interpreted at [place], [place].        MACRO:   None.        Signed by: Carine Noyola 11/5/2024 2:00 PM   Dictation workstation:   [id number]      CT head wo IV contrast   Final Result   CT HEAD:   1. No acute intracranial hemorrhage or depressed calvarial fracture.   2. Mild white matter hypodensity compatible with microangiopathy.             CT CERVICAL SPINE:   1. No acute fracture or traumatic malalignment of the cervical spine.   2. Heterogenous enlargement of the thyroid gland. Further evaluation   with non-emergent thyroid ultrasound on an outpatient basis may be   obtained as per clinical need.   3. Fat density mass within the posterior neck on the right likely   represents a lipoma measuring 6.7 x 4.0 cm.             I personally reviewed the images/study and I agree with the findings   as stated by Dr. Rubens Quach. This study was interpreted at [place], [place].        MACRO:   None.        Signed by: Carine Noyola 11/5/2024 2:00 PM   Dictation workstation:   [id number]      XR forearm right 2 views   Final Result   Midshaft both-bone forearm fracture with displacement and overriding.        Additional nondisplaced distal ulnar diaphyseal fracture.        Signed by: Vinicius Farmer 11/5/2024 1:39 PM   Dictation workstation:   [id number]      XR elbow right 1-2 views   Final Result   Midshaft both-bone forearm fracture with displacement and overriding.        Additional nondisplaced distal ulnar diaphyseal fracture.        Signed by: Vinicius Farmer 11/5/2024 1:39 PM   Dictation workstation:   [id number]      XR wrist right 1-2 views   Final Result   Midshaft both-bone forearm fracture with displacement and overriding.        Additional nondisplaced distal ulnar  diaphyseal fracture.        Signed by: Vinicius Farmer 11/5/2024 1:39 PM   Dictation workstation:   [id number]      XR chest 1 view   Final Result        No evidence of acute intrathoracic abnormality.        Signed by: Vinicius Farmer 11/5/2024 1:40 PM   Dictation workstation:   [id number]          Assessment:  Orthopaedic Problems/Injuries: Right both bone forearm fracture    69M PMH BPH with recent cocaine use, presenting after fall from height sustaining a closed right both bone forearm fracture with significant shortening and angulation. The injury was closed, but given the deformity the patient will require surgical intervention. Plan for ORIF with Dr. Garcia on 11/6/24.    Plan:  - ORIF R BBFFx 11/6/2024 with Dr. Garcia  - Placed in STS  - WB: NWB RUE in STS  - Abx: Perioperative ancef on day of surgery  - Diet: Clear liquid diet at MN, Npo at 0500 for OR on 11/6 with orthopedics  - DVT ppx per primary team protocol  - Please obtain all preop labs (CBC,BMP,EKG, CXR, Coags, Type and Screen)  - [place]aranjo: per primary    - Dispo: Pending OR    Everardo Amezcua MD  On Call Resident - PGY-1 Orthopedic Surgery  [facility]  [employer] [facility] Message Preferred  On-Call Pager [phone]    This patient was seen within 30 minutes of initial consult.  _________________________________________________________    This patient will be followed by the Orthopedic Trauma Team while inpatient. See team members and contacts below:    Ortho Trauma Service (Epic Chat Preferred)  First call: Savanna Heard, PGY-1  Second call: Flavio Anand, PGY-2  Third call: Ivania Manrique, PGY-3    6pm-6am M-F, Holidays, and weekends page Ortho on-call @[phone] with urgent questions/concerns.

## 2024-11-06 ENCOUNTER — ANESTHESIA (OUTPATIENT)
Dept: OPERATING ROOM | Facility: HOSPITAL | Age: 69
End: 2024-11-06
Payer: MEDICARE

## 2024-11-06 ENCOUNTER — APPOINTMENT (OUTPATIENT)
Dept: RADIOLOGY | Facility: HOSPITAL | Age: 69
End: 2024-11-06
Payer: MEDICARE

## 2024-11-06 ENCOUNTER — ANESTHESIA EVENT (OUTPATIENT)
Dept: OPERATING ROOM | Facility: HOSPITAL | Age: 69
End: 2024-11-06
Payer: MEDICARE

## 2024-11-06 LAB
ALBUMIN SERPL BCP-MCNC: 3.5 G/DL (ref 3.4–5)
ANION GAP SERPL CALC-SCNC: 11 MMOL/L (ref 10–20)
APTT PPP: 30 SECONDS (ref 27–38)
BUN SERPL-MCNC: 15 MG/DL (ref 6–23)
CALCIUM SERPL-MCNC: 8.4 MG/DL (ref 8.6–10.6)
CHLORIDE SERPL-SCNC: 108 MMOL/L (ref 98–107)
CO2 SERPL-SCNC: 23 MMOL/L (ref 21–32)
CREAT SERPL-MCNC: 0.67 MG/DL (ref 0.5–1.3)
EGFRCR SERPLBLD CKD-EPI 2021: >90 ML/MIN/1.73M*2
ERYTHROCYTE [DISTWIDTH] IN BLOOD BY AUTOMATED COUNT: 14.7 % (ref 11.5–14.5)
GLUCOSE SERPL-MCNC: 102 MG/DL (ref 74–99)
HCT VFR BLD AUTO: 39.3 % (ref 41–52)
HGB BLD-MCNC: 13.1 G/DL (ref 13.5–17.5)
INR PPP: 1 (ref 0.9–1.1)
MAGNESIUM SERPL-MCNC: 1.85 MG/DL (ref 1.6–2.4)
MCH RBC QN AUTO: 29.8 PG (ref 26–34)
MCHC RBC AUTO-ENTMCNC: 33.3 G/DL (ref 32–36)
MCV RBC AUTO: 89 FL (ref 80–100)
NRBC BLD-RTO: 0 /100 WBCS (ref 0–0)
PHOSPHATE SERPL-MCNC: 3.4 MG/DL (ref 2.5–4.9)
PLATELET # BLD AUTO: 256 X10*3/UL (ref 150–450)
POTASSIUM SERPL-SCNC: 3.8 MMOL/L (ref 3.5–5.3)
PROTHROMBIN TIME: 10.8 SECONDS (ref 9.8–12.8)
RBC # BLD AUTO: 4.4 X10*6/UL (ref 4.5–5.9)
SODIUM SERPL-SCNC: 138 MMOL/L (ref 136–145)
WBC # BLD AUTO: 8.6 X10*3/UL (ref 4.4–11.3)

## 2024-11-06 PROCEDURE — 2500000004 HC RX 250 GENERAL PHARMACY W/ HCPCS (ALT 636 FOR OP/ED): Performed by: ORTHOPAEDIC SURGERY

## 2024-11-06 PROCEDURE — 25575 OPTX RDL&ULN SHFT FX RDS&ULN: CPT | Performed by: ORTHOPAEDIC SURGERY

## 2024-11-06 PROCEDURE — 80069 RENAL FUNCTION PANEL: CPT | Performed by: NURSE PRACTITIONER

## 2024-11-06 PROCEDURE — C1713 ANCHOR/SCREW BN/BN,TIS/BN: HCPCS | Performed by: ORTHOPAEDIC SURGERY

## 2024-11-06 PROCEDURE — 96366 THER/PROPH/DIAG IV INF ADDON: CPT

## 2024-11-06 PROCEDURE — 2500000001 HC RX 250 WO HCPCS SELF ADMINISTERED DRUGS (ALT 637 FOR MEDICARE OP): Performed by: NURSE PRACTITIONER

## 2024-11-06 PROCEDURE — 99232 SBSQ HOSP IP/OBS MODERATE 35: CPT | Performed by: STUDENT IN AN ORGANIZED HEALTH CARE EDUCATION/TRAINING PROGRAM

## 2024-11-06 PROCEDURE — 7100000002 HC RECOVERY ROOM TIME - EACH INCREMENTAL 1 MINUTE: Performed by: ORTHOPAEDIC SURGERY

## 2024-11-06 PROCEDURE — 96365 THER/PROPH/DIAG IV INF INIT: CPT

## 2024-11-06 PROCEDURE — A25575 PR OPEN TX RADIAL AND ULNAR SHAFT FX FIX RADIUS AND ULNA: Performed by: ANESTHESIOLOGY

## 2024-11-06 PROCEDURE — 2500000004 HC RX 250 GENERAL PHARMACY W/ HCPCS (ALT 636 FOR OP/ED): Performed by: ANESTHESIOLOGIST ASSISTANT

## 2024-11-06 PROCEDURE — G0378 HOSPITAL OBSERVATION PER HR: HCPCS

## 2024-11-06 PROCEDURE — 2500000004 HC RX 250 GENERAL PHARMACY W/ HCPCS (ALT 636 FOR OP/ED): Performed by: ANESTHESIOLOGY

## 2024-11-06 PROCEDURE — 2500000002 HC RX 250 W HCPCS SELF ADMINISTERED DRUGS (ALT 637 FOR MEDICARE OP, ALT 636 FOR OP/ED): Performed by: NURSE PRACTITIONER

## 2024-11-06 PROCEDURE — 2500000005 HC RX 250 GENERAL PHARMACY W/O HCPCS: Performed by: ANESTHESIOLOGIST ASSISTANT

## 2024-11-06 PROCEDURE — 3600000003 HC OR TIME - INITIAL BASE CHARGE - PROCEDURE LEVEL THREE: Performed by: ORTHOPAEDIC SURGERY

## 2024-11-06 PROCEDURE — 7100000001 HC RECOVERY ROOM TIME - INITIAL BASE CHARGE: Performed by: ORTHOPAEDIC SURGERY

## 2024-11-06 PROCEDURE — 3700000002 HC GENERAL ANESTHESIA TIME - EACH INCREMENTAL 1 MINUTE: Performed by: ORTHOPAEDIC SURGERY

## 2024-11-06 PROCEDURE — 36415 COLL VENOUS BLD VENIPUNCTURE: CPT | Performed by: NURSE PRACTITIONER

## 2024-11-06 PROCEDURE — 85610 PROTHROMBIN TIME: CPT | Performed by: NURSE PRACTITIONER

## 2024-11-06 PROCEDURE — A25575 PR OPEN TX RADIAL AND ULNAR SHAFT FX FIX RADIUS AND ULNA: Performed by: ANESTHESIOLOGIST ASSISTANT

## 2024-11-06 PROCEDURE — 2720000007 HC OR 272 NO HCPCS: Performed by: ORTHOPAEDIC SURGERY

## 2024-11-06 PROCEDURE — 2500000005 HC RX 250 GENERAL PHARMACY W/O HCPCS: Performed by: ORTHOPAEDIC SURGERY

## 2024-11-06 PROCEDURE — 3600000008 HC OR TIME - EACH INCREMENTAL 1 MINUTE - PROCEDURE LEVEL THREE: Performed by: ORTHOPAEDIC SURGERY

## 2024-11-06 PROCEDURE — 2500000001 HC RX 250 WO HCPCS SELF ADMINISTERED DRUGS (ALT 637 FOR MEDICARE OP): Performed by: ANESTHESIOLOGY

## 2024-11-06 PROCEDURE — 83735 ASSAY OF MAGNESIUM: CPT | Performed by: NURSE PRACTITIONER

## 2024-11-06 PROCEDURE — 3700000001 HC GENERAL ANESTHESIA TIME - INITIAL BASE CHARGE: Performed by: ORTHOPAEDIC SURGERY

## 2024-11-06 PROCEDURE — 85027 COMPLETE CBC AUTOMATED: CPT | Performed by: NURSE PRACTITIONER

## 2024-11-06 PROCEDURE — 1100000001 HC PRIVATE ROOM DAILY

## 2024-11-06 PROCEDURE — 2500000004 HC RX 250 GENERAL PHARMACY W/ HCPCS (ALT 636 FOR OP/ED): Mod: JZ | Performed by: STUDENT IN AN ORGANIZED HEALTH CARE EDUCATION/TRAINING PROGRAM

## 2024-11-06 PROCEDURE — 2780000003 HC OR 278 NO HCPCS: Performed by: ORTHOPAEDIC SURGERY

## 2024-11-06 DEVICE — PLATE LCP 3.5 X 98MM 7H: Type: IMPLANTABLE DEVICE | Site: ARM | Status: FUNCTIONAL

## 2024-11-06 DEVICE — IMPLANTABLE DEVICE: Type: IMPLANTABLE DEVICE | Site: ARM | Status: FUNCTIONAL

## 2024-11-06 DEVICE — IMPLANTABLE DEVICE: Type: IMPLANTABLE DEVICE | Site: ULNA | Status: FUNCTIONAL

## 2024-11-06 DEVICE — PLATE LCP TUBULAR 1/3 69MM 6H: Type: IMPLANTABLE DEVICE | Site: ARM | Status: FUNCTIONAL

## 2024-11-06 DEVICE — SCREW, CORTICAL, SELF-TAPPING, 3.5 X 18 MM, STAINLESS STEEL: Type: IMPLANTABLE DEVICE | Site: ULNA | Status: FUNCTIONAL

## 2024-11-06 DEVICE — SCREW, CORTICAL, SELF-TAPPING, 3.5 X 16 MM, STAINLESS STEEL: Type: IMPLANTABLE DEVICE | Site: ULNA | Status: FUNCTIONAL

## 2024-11-06 RX ORDER — FERROUS SULFATE, DRIED 160(50) MG
1 TABLET, EXTENDED RELEASE ORAL DAILY
Qty: 90 TABLET | Refills: 0 | Status: SHIPPED | OUTPATIENT
Start: 2024-11-06

## 2024-11-06 RX ORDER — HYDROMORPHONE HYDROCHLORIDE 1 MG/ML
0.5 INJECTION, SOLUTION INTRAMUSCULAR; INTRAVENOUS; SUBCUTANEOUS EVERY 5 MIN PRN
Status: DISCONTINUED | OUTPATIENT
Start: 2024-11-06 | End: 2024-11-06 | Stop reason: HOSPADM

## 2024-11-06 RX ORDER — OXYCODONE HYDROCHLORIDE 5 MG/1
10 TABLET ORAL EVERY 6 HOURS PRN
Status: COMPLETED | OUTPATIENT
Start: 2024-11-06 | End: 2024-11-06

## 2024-11-06 RX ORDER — ROCURONIUM BROMIDE 10 MG/ML
INJECTION, SOLUTION INTRAVENOUS AS NEEDED
Status: DISCONTINUED | OUTPATIENT
Start: 2024-11-06 | End: 2024-11-06

## 2024-11-06 RX ORDER — SODIUM CHLORIDE 0.9 G/100ML
IRRIGANT IRRIGATION AS NEEDED
Status: DISCONTINUED | OUTPATIENT
Start: 2024-11-06 | End: 2024-11-06 | Stop reason: HOSPADM

## 2024-11-06 RX ORDER — ONDANSETRON HYDROCHLORIDE 2 MG/ML
INJECTION, SOLUTION INTRAVENOUS AS NEEDED
Status: DISCONTINUED | OUTPATIENT
Start: 2024-11-06 | End: 2024-11-06

## 2024-11-06 RX ORDER — LIDOCAINE HYDROCHLORIDE 10 MG/ML
0.1 INJECTION, SOLUTION INFILTRATION; PERINEURAL ONCE
Status: DISCONTINUED | OUTPATIENT
Start: 2024-11-06 | End: 2024-11-06 | Stop reason: HOSPADM

## 2024-11-06 RX ORDER — DROPERIDOL 2.5 MG/ML
0.62 INJECTION, SOLUTION INTRAMUSCULAR; INTRAVENOUS ONCE AS NEEDED
Status: DISCONTINUED | OUTPATIENT
Start: 2024-11-06 | End: 2024-11-06 | Stop reason: HOSPADM

## 2024-11-06 RX ORDER — FENTANYL CITRATE 50 UG/ML
12.5 INJECTION, SOLUTION INTRAMUSCULAR; INTRAVENOUS EVERY 5 MIN PRN
Status: DISCONTINUED | OUTPATIENT
Start: 2024-11-06 | End: 2024-11-06 | Stop reason: HOSPADM

## 2024-11-06 RX ORDER — VANCOMYCIN HYDROCHLORIDE 1 G/20ML
INJECTION, POWDER, LYOPHILIZED, FOR SOLUTION INTRAVENOUS AS NEEDED
Status: DISCONTINUED | OUTPATIENT
Start: 2024-11-06 | End: 2024-11-06 | Stop reason: HOSPADM

## 2024-11-06 RX ORDER — CEFAZOLIN SODIUM 2 G/100ML
2 INJECTION, SOLUTION INTRAVENOUS EVERY 8 HOURS
Status: CANCELLED | OUTPATIENT
Start: 2024-11-06 | End: 2024-11-07

## 2024-11-06 RX ORDER — TRANEXAMIC ACID 100 MG/ML
INJECTION, SOLUTION INTRAVENOUS AS NEEDED
Status: DISCONTINUED | OUTPATIENT
Start: 2024-11-06 | End: 2024-11-06

## 2024-11-06 RX ORDER — LIDOCAINE HCL/PF 100 MG/5ML
SYRINGE (ML) INTRAVENOUS AS NEEDED
Status: DISCONTINUED | OUTPATIENT
Start: 2024-11-06 | End: 2024-11-06

## 2024-11-06 RX ORDER — SODIUM CHLORIDE, SODIUM GLUCONATE, SODIUM ACETATE, POTASSIUM CHLORIDE AND MAGNESIUM CHLORIDE 30; 37; 368; 526; 502 MG/100ML; MG/100ML; MG/100ML; MG/100ML; MG/100ML
INJECTION, SOLUTION INTRAVENOUS CONTINUOUS PRN
Status: DISCONTINUED | OUTPATIENT
Start: 2024-11-06 | End: 2024-11-06

## 2024-11-06 RX ORDER — SODIUM CHLORIDE, SODIUM LACTATE, POTASSIUM CHLORIDE, CALCIUM CHLORIDE 600; 310; 30; 20 MG/100ML; MG/100ML; MG/100ML; MG/100ML
50 INJECTION, SOLUTION INTRAVENOUS CONTINUOUS
Status: DISCONTINUED | OUTPATIENT
Start: 2024-11-06 | End: 2024-11-06 | Stop reason: HOSPADM

## 2024-11-06 RX ORDER — CEFAZOLIN 1 G/1
INJECTION, POWDER, FOR SOLUTION INTRAVENOUS AS NEEDED
Status: DISCONTINUED | OUTPATIENT
Start: 2024-11-06 | End: 2024-11-06

## 2024-11-06 RX ORDER — ACETAMINOPHEN 325 MG/1
650 TABLET ORAL EVERY 4 HOURS PRN
Status: DISCONTINUED | OUTPATIENT
Start: 2024-11-06 | End: 2024-11-06 | Stop reason: HOSPADM

## 2024-11-06 RX ORDER — OXYCODONE HYDROCHLORIDE 5 MG/1
5 TABLET ORAL EVERY 4 HOURS PRN
Status: DISCONTINUED | OUTPATIENT
Start: 2024-11-06 | End: 2024-11-06 | Stop reason: HOSPADM

## 2024-11-06 RX ORDER — FENTANYL CITRATE 50 UG/ML
INJECTION, SOLUTION INTRAMUSCULAR; INTRAVENOUS AS NEEDED
Status: DISCONTINUED | OUTPATIENT
Start: 2024-11-06 | End: 2024-11-06

## 2024-11-06 RX ORDER — PROPOFOL 10 MG/ML
INJECTION, EMULSION INTRAVENOUS AS NEEDED
Status: DISCONTINUED | OUTPATIENT
Start: 2024-11-06 | End: 2024-11-06

## 2024-11-06 RX ORDER — CEFAZOLIN SODIUM 2 G/100ML
2 INJECTION, SOLUTION INTRAVENOUS EVERY 8 HOURS
Status: COMPLETED | OUTPATIENT
Start: 2024-11-06 | End: 2024-11-07

## 2024-11-06 RX ORDER — HYDROMORPHONE HYDROCHLORIDE 1 MG/ML
INJECTION, SOLUTION INTRAMUSCULAR; INTRAVENOUS; SUBCUTANEOUS AS NEEDED
Status: DISCONTINUED | OUTPATIENT
Start: 2024-11-06 | End: 2024-11-06

## 2024-11-06 RX ORDER — ONDANSETRON HYDROCHLORIDE 2 MG/ML
4 INJECTION, SOLUTION INTRAVENOUS ONCE AS NEEDED
Status: DISCONTINUED | OUTPATIENT
Start: 2024-11-06 | End: 2024-11-06 | Stop reason: HOSPADM

## 2024-11-06 RX ORDER — MIDAZOLAM HYDROCHLORIDE 1 MG/ML
INJECTION INTRAMUSCULAR; INTRAVENOUS AS NEEDED
Status: DISCONTINUED | OUTPATIENT
Start: 2024-11-06 | End: 2024-11-06

## 2024-11-06 SDOH — ECONOMIC STABILITY: FOOD INSECURITY: WITHIN THE PAST 12 MONTHS, YOU WORRIED THAT YOUR FOOD WOULD RUN OUT BEFORE YOU GOT THE MONEY TO BUY MORE.: NEVER TRUE

## 2024-11-06 SDOH — ECONOMIC STABILITY: FOOD INSECURITY: HOW HARD IS IT FOR YOU TO PAY FOR THE VERY BASICS LIKE FOOD, HOUSING, MEDICAL CARE, AND HEATING?: NOT HARD AT ALL

## 2024-11-06 SDOH — SOCIAL STABILITY: SOCIAL INSECURITY: ARE YOU OR HAVE YOU BEEN THREATENED OR ABUSED PHYSICALLY, EMOTIONALLY, OR SEXUALLY BY ANYONE?: NO

## 2024-11-06 SDOH — ECONOMIC STABILITY: HOUSING INSECURITY: IN THE LAST 12 MONTHS, WAS THERE A TIME WHEN YOU WERE NOT ABLE TO PAY THE MORTGAGE OR RENT ON TIME?: NO

## 2024-11-06 SDOH — SOCIAL STABILITY: SOCIAL INSECURITY: DOES ANYONE TRY TO KEEP YOU FROM HAVING/CONTACTING OTHER FRIENDS OR DOING THINGS OUTSIDE YOUR HOME?: NO

## 2024-11-06 SDOH — ECONOMIC STABILITY: FOOD INSECURITY: WITHIN THE PAST 12 MONTHS, THE FOOD YOU BOUGHT JUST DIDN'T LAST AND YOU DIDN'T HAVE MONEY TO GET MORE.: NEVER TRUE

## 2024-11-06 SDOH — SOCIAL STABILITY: SOCIAL INSECURITY: DO YOU FEEL UNSAFE GOING BACK TO THE PLACE WHERE YOU ARE LIVING?: NO

## 2024-11-06 SDOH — SOCIAL STABILITY: SOCIAL INSECURITY: WITHIN THE LAST YEAR, HAVE YOU BEEN AFRAID OF YOUR PARTNER OR EX-PARTNER?: NO

## 2024-11-06 SDOH — ECONOMIC STABILITY: INCOME INSECURITY: IN THE PAST 12 MONTHS HAS THE ELECTRIC, GAS, OIL, OR WATER COMPANY THREATENED TO SHUT OFF SERVICES IN YOUR HOME?: NO

## 2024-11-06 SDOH — SOCIAL STABILITY: SOCIAL INSECURITY: DO YOU FEEL ANYONE HAS EXPLOITED OR TAKEN ADVANTAGE OF YOU FINANCIALLY OR OF YOUR PERSONAL PROPERTY?: NO

## 2024-11-06 SDOH — SOCIAL STABILITY: SOCIAL INSECURITY
WITHIN THE LAST YEAR, HAVE YOU BEEN RAPED OR FORCED TO HAVE ANY KIND OF SEXUAL ACTIVITY BY YOUR PARTNER OR EX-PARTNER?: NO

## 2024-11-06 SDOH — ECONOMIC STABILITY: HOUSING INSECURITY: IN THE PAST 12 MONTHS, HOW MANY TIMES HAVE YOU MOVED WHERE YOU WERE LIVING?: 0

## 2024-11-06 SDOH — SOCIAL STABILITY: SOCIAL INSECURITY: ABUSE: ADULT

## 2024-11-06 SDOH — ECONOMIC STABILITY: TRANSPORTATION INSECURITY: IN THE PAST 12 MONTHS, HAS LACK OF TRANSPORTATION KEPT YOU FROM MEDICAL APPOINTMENTS OR FROM GETTING MEDICATIONS?: NO

## 2024-11-06 SDOH — HEALTH STABILITY: MENTAL HEALTH: CURRENT SMOKER: 1

## 2024-11-06 SDOH — SOCIAL STABILITY: SOCIAL INSECURITY: ARE THERE ANY APPARENT SIGNS OF INJURIES/BEHAVIORS THAT COULD BE RELATED TO ABUSE/NEGLECT?: NO

## 2024-11-06 SDOH — SOCIAL STABILITY: SOCIAL INSECURITY: WERE YOU ABLE TO COMPLETE ALL THE BEHAVIORAL HEALTH SCREENINGS?: YES

## 2024-11-06 SDOH — SOCIAL STABILITY: SOCIAL INSECURITY: WITHIN THE LAST YEAR, HAVE YOU BEEN HUMILIATED OR EMOTIONALLY ABUSED IN OTHER WAYS BY YOUR PARTNER OR EX-PARTNER?: NO

## 2024-11-06 SDOH — SOCIAL STABILITY: SOCIAL INSECURITY
WITHIN THE LAST YEAR, HAVE YOU BEEN KICKED, HIT, SLAPPED, OR OTHERWISE PHYSICALLY HURT BY YOUR PARTNER OR EX-PARTNER?: NO

## 2024-11-06 SDOH — ECONOMIC STABILITY: HOUSING INSECURITY: AT ANY TIME IN THE PAST 12 MONTHS, WERE YOU HOMELESS OR LIVING IN A SHELTER (INCLUDING NOW)?: NO

## 2024-11-06 SDOH — SOCIAL STABILITY: SOCIAL INSECURITY: HAVE YOU HAD ANY THOUGHTS OF HARMING ANYONE ELSE?: NO

## 2024-11-06 SDOH — SOCIAL STABILITY: SOCIAL INSECURITY: HAS ANYONE EVER THREATENED TO HURT YOUR FAMILY OR YOUR PETS?: NO

## 2024-11-06 SDOH — SOCIAL STABILITY: SOCIAL INSECURITY: HAVE YOU HAD THOUGHTS OF HARMING ANYONE ELSE?: NO

## 2024-11-06 ASSESSMENT — COGNITIVE AND FUNCTIONAL STATUS - GENERAL
DAILY ACTIVITIY SCORE: 24
MOBILITY SCORE: 24
MOBILITY SCORE: 24
PATIENT BASELINE BEDBOUND: NO
DAILY ACTIVITIY SCORE: 24

## 2024-11-06 ASSESSMENT — COLUMBIA-SUICIDE SEVERITY RATING SCALE - C-SSRS
6. HAVE YOU EVER DONE ANYTHING, STARTED TO DO ANYTHING, OR PREPARED TO DO ANYTHING TO END YOUR LIFE?: NO
2. HAVE YOU ACTUALLY HAD ANY THOUGHTS OF KILLING YOURSELF?: NO
1. IN THE PAST MONTH, HAVE YOU WISHED YOU WERE DEAD OR WISHED YOU COULD GO TO SLEEP AND NOT WAKE UP?: NO

## 2024-11-06 ASSESSMENT — PAIN SCALES - GENERAL
PAINLEVEL_OUTOF10: 5 - MODERATE PAIN
PAINLEVEL_OUTOF10: 6
PAINLEVEL_OUTOF10: 7
PAINLEVEL_OUTOF10: 6
PAINLEVEL_OUTOF10: 6
PAINLEVEL_OUTOF10: 8
PAINLEVEL_OUTOF10: 0 - NO PAIN
PAINLEVEL_OUTOF10: 5 - MODERATE PAIN
PAIN_LEVEL: 2
PAINLEVEL_OUTOF10: 8
PAINLEVEL_OUTOF10: 2
PAINLEVEL_OUTOF10: 8
PAINLEVEL_OUTOF10: 9
PAINLEVEL_OUTOF10: 7
PAINLEVEL_OUTOF10: 7
PAINLEVEL_OUTOF10: 6

## 2024-11-06 ASSESSMENT — PAIN DESCRIPTION - ORIENTATION
ORIENTATION: RIGHT

## 2024-11-06 ASSESSMENT — PAIN - FUNCTIONAL ASSESSMENT
PAIN_FUNCTIONAL_ASSESSMENT: 0-10
PAIN_FUNCTIONAL_ASSESSMENT: UNABLE TO SELF-REPORT
PAIN_FUNCTIONAL_ASSESSMENT: 0-10
PAIN_FUNCTIONAL_ASSESSMENT: UNABLE TO SELF-REPORT
PAIN_FUNCTIONAL_ASSESSMENT: UNABLE TO SELF-REPORT
PAIN_FUNCTIONAL_ASSESSMENT: 0-10
PAIN_FUNCTIONAL_ASSESSMENT: UNABLE TO SELF-REPORT
PAIN_FUNCTIONAL_ASSESSMENT: 0-10
PAIN_FUNCTIONAL_ASSESSMENT: 0-10
PAIN_FUNCTIONAL_ASSESSMENT: UNABLE TO SELF-REPORT
PAIN_FUNCTIONAL_ASSESSMENT: 0-10

## 2024-11-06 ASSESSMENT — ACTIVITIES OF DAILY LIVING (ADL)
GROOMING: NEEDS ASSISTANCE
BATHING: NEEDS ASSISTANCE
LACK_OF_TRANSPORTATION: NO
ADEQUATE_TO_COMPLETE_ADL: YES
TOILETING: INDEPENDENT
JUDGMENT_ADEQUATE_SAFELY_COMPLETE_DAILY_ACTIVITIES: YES
HEARING - RIGHT EAR: FUNCTIONAL
WALKS IN HOME: INDEPENDENT
FEEDING YOURSELF: NEEDS ASSISTANCE
LACK_OF_TRANSPORTATION: NO
HEARING - LEFT EAR: FUNCTIONAL
DRESSING YOURSELF: NEEDS ASSISTANCE
PATIENT'S MEMORY ADEQUATE TO SAFELY COMPLETE DAILY ACTIVITIES?: YES

## 2024-11-06 ASSESSMENT — PATIENT HEALTH QUESTIONNAIRE - PHQ9
SUM OF ALL RESPONSES TO PHQ9 QUESTIONS 1 & 2: 0
1. LITTLE INTEREST OR PLEASURE IN DOING THINGS: NOT AT ALL
2. FEELING DOWN, DEPRESSED OR HOPELESS: NOT AT ALL

## 2024-11-06 ASSESSMENT — PAIN DESCRIPTION - LOCATION
LOCATION: ARM

## 2024-11-06 ASSESSMENT — LIFESTYLE VARIABLES
AUDIT-C TOTAL SCORE: 4
HOW OFTEN DO YOU HAVE 6 OR MORE DRINKS ON ONE OCCASION: LESS THAN MONTHLY
SKIP TO QUESTIONS 9-10: 0
HOW MANY STANDARD DRINKS CONTAINING ALCOHOL DO YOU HAVE ON A TYPICAL DAY: 1 OR 2
HOW OFTEN DO YOU HAVE A DRINK CONTAINING ALCOHOL: 2-3 TIMES A WEEK
AUDIT-C TOTAL SCORE: 4

## 2024-11-06 ASSESSMENT — PAIN DESCRIPTION - DESCRIPTORS
DESCRIPTORS: ACHING
DESCRIPTORS: ACHING
DESCRIPTORS: DULL

## 2024-11-06 NOTE — CARE PLAN
The patient's goals for the shift include pain control.    The clinical goals for the shift include pain management.    Over the shift, the patient did not make progress toward the following goals. Barriers to progression include n/a. Recommendations to address these barriers include n/a.

## 2024-11-06 NOTE — ANESTHESIA PREPROCEDURE EVALUATION
Patient: Grover Bauer    Procedure Information       Date/Time: 11/06/24 0800    Procedure: Open Reduction Internal Fixation Radius and Ulna (Right: Arm Lower)    Location: [facility] [place] OR 01 / [facility] OR    Surgeons: Cayden Garcia MD            Relevant Problems   Anesthesia (within normal limits)  Past Surgical History:  01/13/2015: OTHER SURGICAL HISTORY      Comment:  Needle Biopsy Of Prostate        Cardiac  EKG 11.5.24:    Sinus rhythm with occasional Premature ventricular complexes  Otherwise normal ECG  When compared with ECG of 26-JAN-2015 09:03,  Premature ventricular complexes are now Present      Pulmonary  Social History    Tobacco Use      Smoking status: Every Day        Types: Cigarettes      Smokeless tobacco: Never    Patient smokes marijuana.  Last used week of 10/31/24  Patient uses cocaine last used ~ 10/31/24      /Renal   (+) BPH with obstruction/lower urinary tract symptoms       Clinical information reviewed:   Tobacco  Allergies  Meds  Problems  Med Hx  Surg Hx   Fam Hx  Soc   Hx        NPO Detail:  NPO/Void Status  Carbohydrate Drink Given Prior to Surgery? : N  Date of Last Liquid: 11/06/24  Time of Last Liquid: 2300  Date of Last Solid: 11/05/24  Time of Last Solid: 2300  Last Intake Type: Clear fluids         Physical Exam    Airway  Mallampati: I  TM distance: >3 FB     Cardiovascular   Rhythm: regular  Rate: normal     Dental    Pulmonary - normal exam     Abdominal            Anesthesia Plan    History of general anesthesia?: yes  History of complications of general anesthesia?: no    ASA 3     general     The patient is a current smoker.  Patient did not smoke on day of procedure.    intravenous induction   Postoperative administration of opioids is intended.  Anesthetic plan and risks discussed with patient.  Use of blood products discussed with patient who consented to blood products.    Plan discussed with [facility].

## 2024-11-06 NOTE — CARE PLAN
The patient's goals for the shift include  surgery to rt. Arm.    The clinical goals for the shift include pain management.    Over the shift, the patient did not make progress toward the following goals. Barriers to progression include pt. Had surgery to rt. Arm and Volar splint placed.  Recommendations to address these barriers include pt. Denies having pain @ present time.

## 2024-11-06 NOTE — DISCHARGE INSTRUCTIONS
You had your arm fracture repaired by orthopedics. Please see instructions from orthopedics as below.     You had elevations in your blood pressure. It is not recommended to start antihypertensives in the acute care setting. Please follow up with your primary care for blood pressure check and management.     You were prescribed opioids for pain control, these medications should be used only as needed for persistent pain not controlled by other medications/means and should be targeted to optimize your function.  It is important to know that the goal of opioids is not to take away pain, but make pain more tolerable for your functional recovery.  Please note that opioids are associated with several side effects including but not limited to confusion and altered mental status, increased risk of falls, constipation and stool impaction, nausea and vomiting, dizziness, and manage can depress your heart and respiratory function and in overdose lead to death.  Please monitor yourself for side effects, and if you experience any side effects please stop the medication and talk to your outpatient care provider or seek medical attention. You should always discuss any side effects experience with your outpatient providers or seek medical attention for concerning side effects. Please also note that the serious adverse effects of opioids (change in cognition, respiratory depression and cardiovascular cardiovascular depression, fall risk, dizziness) may be compounded by other sedating medications, alcohol, drugs, muscle relaxants, sleeping pills among others and can lead to comorbidities and death.   If you have any concerns, please stop the medication and speak to a medical provider or seek medical attention.  While you are on opioids, please do not drive, operate machinery, or undertake any high risk activity where a transient change in your awareness could lead to significant injury or death of your self or others.     Follow-up  with primary care within 7 to 10 days or next soonest available for post-hospitalization assessment and examination.     Please take all medications as prescribed and keep all follow-up appointments.  Please contact your primary care physician with any questions or concerns that arise.  You may contact your outpatient specialists office for any questions regarding specifics relating to their recommendations. Please monitor your symptoms and come to the emergency department with worsening of your symptoms, severe chest pain, shortness of breath, or any other medical emergency or concerns for your health.    Orthopedic instructions  Follow-Up Instructions  You will need to be seen in clinic by Dr Garcia in 2 week(s) for a post-operative evaluation.     You will need to call and schedule an appointment, unless there is a previous appointment that appears on your discharge instructions.  The direct orthopaedic clinic appointment line phone number is [phone].  Please do not delay in calling to make this appointment.    You should also follow up with your primary care provider in 1-2 weeks.    Activity Restrictions  1) No driving until further instructed by your orthopaedic physician, which will be addressed at your outpatient appointments.    2) No driving or operating heavy machinery while taking narcotic pain medication.    3) Weight bearing status --> No weight bearing right arm.     Splint/Cast care instructions:   1) Keep your splint on until your follow up visit with your surgeon.    2) Do not get your splint/cast wet for any reason. This includes protecting it from shower water, bath water, and the rain. If the cast/splint becomes wet for any reason, you need to be seen immediately, either in the emergency department or in the first available clinic appointment, in order to have the splint/cast changed. Allowing a wet splint/cast to sit on your skin may cause skin breakdown and infection.    3) Do not stick  any sharp objects (knives, forks, clothes hangers, etc) inside your splint/cast to itch. These objects scratch the skin, which may become infected.     4) You should keep your operative or injured extremity elevated at or above the level of your heart for the first 48-72 hours. This will help minimize the postoperative swelling.     5) You may ice your injured/operative extremity, which is especially useful to minimize swelling, in the first 48-72 hours. Make sure that the ice is not in direct contact with your skin, and that the ice does not leak out of its bag onto your splint.    6) If you begin to experience progressive and rapidly increasing pain that seems out of proportion to what you normally have been experiencing from your baseline pain after surgery/injury, or if your hand or foot become numb or turn blue and cold - you NEED TO CALL US IMMEDIATELY. Alternatively, you may come into the emergency department IMMEDIATELY for an emergent evaluation.

## 2024-11-06 NOTE — PROGRESS NOTES
"Orthopaedic Surgery Progress Note    Subjective:  No acute events overnight. Pain well controlled. Denies chest pain, shortness of breath, or fevers. Clear liquids since midnight, NPO since 0500. Still amenable to OR today.    Objective:  /86 (BP Location: Left arm)   Pulse 75   Temp 36.7 °C (98.1 °F) (Temporal)   Resp 16   Ht 1.778 m (5' 10\")   Wt 97.8 kg (215 lb 11.2 oz)   SpO2 98%   BMI 30.95 kg/m²     Gen: arousable, NAD, appropriately conversational  Cardiac: RRR to peripheral palpation  Resp: nonlabored on RA  GI: soft, nondistended    MSK:  RUE:   - STS intact  -Motor intact in axillary/AIN/PIN/ulnar distributions  -SILT axillary/radial/median/ulnar distributions  -Hand wwp, cap refill brisk  -Compartments soft and compressible, no pain with passive stretch of digits    Results for orders placed or performed during the hospital encounter of 11/05/24 (from the past 24 hours)   CBC   Result Value Ref Range    WBC 7.5 4.4 - 11.3 x10*3/uL    nRBC 0.0 0.0 - 0.0 /100 WBCs    RBC 4.56 4.50 - 5.90 x10*6/uL    Hemoglobin 13.4 (L) 13.5 - 17.5 g/dL    Hematocrit 39.2 (L) 41.0 - 52.0 %    MCV 86 80 - 100 fL    MCH 29.4 26.0 - 34.0 pg    MCHC 34.2 32.0 - 36.0 g/dL    RDW 14.6 (H) 11.5 - 14.5 %    Platelets 268 150 - 450 x10*3/uL   Basic metabolic panel   Result Value Ref Range    Glucose 155 (H) 74 - 99 mg/dL    Sodium 141 136 - 145 mmol/L    Potassium 4.1 3.5 - 5.3 mmol/L    Chloride 106 98 - 107 mmol/L    Bicarbonate 21 21 - 32 mmol/L    Anion Gap 18 10 - 20 mmol/L    Urea Nitrogen 16 6 - 23 mg/dL    Creatinine 0.65 0.50 - 1.30 mg/dL    eGFR >90 >60 mL/min/1.73m*2    Calcium 8.8 8.6 - 10.6 mg/dL   Coagulation Screen   Result Value Ref Range    Protime 10.2 9.8 - 12.8 seconds    INR 0.9 0.9 - 1.1    aPTT 27 27 - 38 seconds   Type and Screen   Result Value Ref Range    ABO TYPE O     Rh TYPE POS     ANTIBODY SCREEN NEG    Ethanol   Result Value Ref Range    Alcohol <10 <=10 mg/dL   TSH with reflex to Free T4 " if abnormal   Result Value Ref Range    Thyroid Stimulating Hormone 1.65 0.44 - 3.98 mIU/L   Drug Screen, Urine   Result Value Ref Range    Amphetamine Screen, Urine Presumptive Negative Presumptive Negative    Barbiturate Screen, Urine Presumptive Negative Presumptive Negative    Benzodiazepines Screen, Urine Presumptive Negative Presumptive Negative    Cannabinoid Screen, Urine Presumptive Positive (A) Presumptive Negative    Cocaine Metabolite Screen, Urine Presumptive Positive (A) Presumptive Negative    Fentanyl Screen, Urine Presumptive Positive (A) Presumptive Negative    Opiate Screen, Urine Presumptive Positive (A) Presumptive Negative    Oxycodone Screen, Urine Presumptive Negative Presumptive Negative    PCP Screen, Urine Presumptive Negative Presumptive Negative    Methadone Screen, Urine Presumptive Negative Presumptive Negative   ECG 12 Lead   Result Value Ref Range    Ventricular Rate 82 BPM    Atrial Rate 82 BPM    WY Interval 142 ms    QRS Duration 100 ms    QT Interval 344 ms    QTC Calculation(Bazett) 401 ms    P Axis 62 degrees    R Axis -25 degrees    T Axis 57 degrees    QRS Count 14 beats    Q Onset 208 ms    P Onset 137 ms    P Offset 202 ms    T Offset 380 ms    QTC Fredericia 381 ms   CBC   Result Value Ref Range    WBC 8.6 4.4 - 11.3 x10*3/uL    nRBC 0.0 0.0 - 0.0 /100 WBCs    RBC 4.40 (L) 4.50 - 5.90 x10*6/uL    Hemoglobin 13.1 (L) 13.5 - 17.5 g/dL    Hematocrit 39.3 (L) 41.0 - 52.0 %    MCV 89 80 - 100 fL    MCH 29.8 26.0 - 34.0 pg    MCHC 33.3 32.0 - 36.0 g/dL    RDW 14.7 (H) 11.5 - 14.5 %    Platelets 256 150 - 450 x10*3/uL   Renal Function Panel   Result Value Ref Range    Glucose 102 (H) 74 - 99 mg/dL    Sodium 138 136 - 145 mmol/L    Potassium 3.8 3.5 - 5.3 mmol/L    Chloride 108 (H) 98 - 107 mmol/L    Bicarbonate 23 21 - 32 mmol/L    Anion Gap 11 10 - 20 mmol/L    Urea Nitrogen 15 6 - 23 mg/dL    Creatinine 0.67 0.50 - 1.30 mg/dL    eGFR >90 >60 mL/min/1.73m*2    Calcium 8.4 (L)  8.6 - 10.6 mg/dL    Phosphorus 3.4 2.5 - 4.9 mg/dL    Albumin 3.5 3.4 - 5.0 g/dL   Magnesium   Result Value Ref Range    Magnesium 1.85 1.60 - 2.40 mg/dL   Coagulation Screen   Result Value Ref Range    Protime 10.8 9.8 - 12.8 seconds    INR 1.0 0.9 - 1.1    aPTT 30 27 - 38 seconds       XR forearm right 2 views   Final Result        Interval reduction right both-bone forearm fractures. The angulation   is improved but there is still displacement and overriding of both   the radius and ulna.        Nondisplaced distal ulnar diaphyseal fracture not as well seen as on   the earlier radiographs.        Signed by: Vinicius Farmer 11/5/2024 5:02 PM   Dictation workstation:   [id number]      XR elbow right 1-2 views   Final Result        Interval reduction right both-bone forearm fractures. The angulation   is improved but there is still displacement and overriding of both   the radius and ulna.        Nondisplaced distal ulnar diaphyseal fracture not as well seen as on   the earlier radiographs.        Signed by: Vinicius Farmer 11/5/2024 5:02 PM   Dictation workstation:   [id number]      CT cervical spine wo IV contrast   Final Result   CT HEAD:   1. No acute intracranial hemorrhage or depressed calvarial fracture.   2. Mild white matter hypodensity compatible with microangiopathy.             CT CERVICAL SPINE:   1. No acute fracture or traumatic malalignment of the cervical spine.   2. Heterogenous enlargement of the thyroid gland. Further evaluation   with non-emergent thyroid ultrasound on an outpatient basis may be   obtained as per clinical need.   3. Fat density mass within the posterior neck on the right likely   represents a lipoma measuring 6.7 x 4.0 cm.             I personally reviewed the images/study and I agree with the findings   as stated by Dr. Rubens Quach. This study was interpreted at [facility], [place], [place].        MACRO:   None.        Signed by: Carine Noyola  11/5/2024 2:00 PM   Dictation workstation:   [id number]      CT head wo IV contrast   Final Result   CT HEAD:   1. No acute intracranial hemorrhage or depressed calvarial fracture.   2. Mild white matter hypodensity compatible with microangiopathy.             CT CERVICAL SPINE:   1. No acute fracture or traumatic malalignment of the cervical spine.   2. Heterogenous enlargement of the thyroid gland. Further evaluation   with non-emergent thyroid ultrasound on an outpatient basis may be   obtained as per clinical need.   3. Fat density mass within the posterior neck on the right likely   represents a lipoma measuring 6.7 x 4.0 cm.             I personally reviewed the images/study and I agree with the findings   as stated by Dr. Rubens Quach. This study was interpreted at [facility], [place], [place].        MACRO:   None.        Signed by: Carine Noyola 11/5/2024 2:00 PM   Dictation workstation:   [id number]      XR forearm right 2 views   Final Result   Midshaft both-bone forearm fracture with displacement and overriding.        Additional nondisplaced distal ulnar diaphyseal fracture.        Signed by: Vinicius Farmer 11/5/2024 1:39 PM   Dictation workstation:   [id number]      XR elbow right 1-2 views   Final Result   Midshaft both-bone forearm fracture with displacement and overriding.        Additional nondisplaced distal ulnar diaphyseal fracture.        Signed by: Vinicius Farmer 11/5/2024 1:39 PM   Dictation workstation:   [id number]      XR wrist right 1-2 views   Final Result   Midshaft both-bone forearm fracture with displacement and overriding.        Additional nondisplaced distal ulnar diaphyseal fracture.        Signed by: Vinicius Farmer 11/5/2024 1:39 PM   Dictation workstation:   [id number]      XR chest 1 view   Final Result        No evidence of acute intrathoracic abnormality.        Signed by: Vinicius Farmer 11/5/2024 1:40 PM   Dictation workstation:   [id number]      [place]  less than 1 hour    (Results Pending)       Assessment/Plan: 69 y.o. male c/p ORIF R forearm on 11/6/24 with Dr. Garcia.  Clear per medicine (11/4 note)    - Weightbearing: NWB RUE  - DVT PPx: SCDs, DVT chemoppx per primary, however recommend at least 4 weeks of DVT chemoprophylaxis  - Pain: Multimodal pain regimen per primary  - Antibiotics: postop Ancef 2g q8hr x 3 doses  - Dressing: STS intact  - Drain: None  - PT/OT consult  - Clear liquids since midnight, NPO since 0500.    Plan discussed with Dr. Garcia. Recommendations not finalized until note signed by attending.    Brianna Heard MD  Orthopedic Surgery PGY-1  Available by Epic Chat    While admitted, this patient will be followed by the Ortho Trauma Team, available via Epic Chat weekdays 6a-6p. Please page [phone] on nights and weekends.    Ortho Trauma  First Call: Brianna Heard, PGY-1  Second Call: Flavio Anand PGY-2  Third Call: Margo Manrique, PGY-3

## 2024-11-06 NOTE — PROGRESS NOTES
11/06/24 1528   Discharge Planning   Living Arrangements Spouse/significant other   Support Systems Family members   Assistance Needed Independent for adls   Type of Residence Private residence   Number of Stairs to Enter Residence 6   Number of Stairs Within Residence 0   Do you have animals or pets at home? No   Home or Post Acute Services None   Expected Discharge Disposition Home   Does the patient need discharge transport arranged? No   Financial Resource Strain   How hard is it for you to pay for the very basics like food, housing, medical care, and heating? Not hard   Housing Stability   In the last 12 months, was there a time when you were not able to pay the mortgage or rent on time? N   At any time in the past 12 months, were you homeless or living in a shelter (including now)? N   Transportation Needs   In the past 12 months, has lack of transportation kept you from medical appointments or from getting medications? no   In the past 12 months, has lack of transportation kept you from meetings, work, or from getting things needed for daily living? No     Transitional Care Coordination Progress Note:  Patient discussed during interdisciplinary rounds.   Team members present: MD, TCC  Plan per Medical/Surgical team: Closed fracture of lower end of radius with ulna  Payor: [employer]  Discharge disposition: PT/OT pending  Potential Barriers: none  ADOD: 1-2 days    Previous Home Care: NA  DME: NA  Pharmacy: [facility]  Falls: 1 fall prior to admit  PCP:  No PCP; agreeable to new pcp appointment, MD updated.  Met with patient at bedside, provided introduction of self and role. Patient states he lives at home with significant other. Patient states he is normally independent for adls; safe at home. Patient states he normally takes the bus to get to appointments. Patient states no concerns obtaining/affording medications; states no social/financial concerns. Patient states family will provide  transport home at time of discharge. Will continue to monitor for discharge planning needs.     Aissatou JAMESN, RN  Transitional Care Coordinator (TCC)  [phone]

## 2024-11-06 NOTE — H&P
H&P reviewed. The patient was examined and there are no changes to the H&P.     Mauricio Reinoso MD  Orthopaedic Surgery, PGY2

## 2024-11-06 NOTE — PROGRESS NOTES
Assessment/Plan   Grover Bauer is a 69 y.o. male with a PMH of cocaine abuse, tobacco use disorder, and BPH. Mr. Bauer presented to ED via EMS for further evaluation after he fell 12 ft off a ladder. Pt had noted deformity of right forearm with abrasions present on his right lower leg and abdomen. Splint was placed to right arm in field by EMS.  Labs obtained on admit notable for mild anemia. Imaging obtained while in the ED showed midshaft both-bone forearm fracture with displacement and overriding. 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 Ortho consulted, planning R forearm ORIF 11/6. .     #Closed fracture R radius/ulna  - Xray imaging of R wrist/elbow/forearm: Midshaft both-bone forearm fracture with displacement and overriding.  Additional nondisplaced distal ulnar diaphyseal fracture.  - ortho consulted appreciate recs. planning R forearm ORIF 11/6.   - Pain regimen: Tylenol 650 mg q6h PRN mild pain, Oxycodone 5 mg q6h PRN moderate pain, and Oxycodone 10 mg PO q6h PRN severe pain  - Bowel regimen while receiving narcotics: Senokot S 2 tabs PO BID  - falls precautions  - PT/OT consulted. Awaiting eval, Pt is prev independent and likely pt able to dc home with op therapy script.     #abnormal imaging of thyroid  - CTH/c-spine: showed incident heterogenous enlargement of the thyroid gland. Will need op  thyroid ultrasound. Also noted to have a fat density mass within the posterior neck on the right likely represents a lipoma measuring 6.7 x 4.0 cm.  - TSH snl  - pt will need to US of thyroid scheduled on discharge and follow-up with PCP     #HTN  - no home meds  - BP on admit 160/94; last /94  - will monitor bp, hold amlodipine. Pt will need op bp check with pcp.      #BPH with LUTS  - c/w home dose of Proscar 5 mg PO every day and Flomax 0.8 mg  "PO at bedtime  - pt will need refill on Proscar at discharge; pt reports he was told by urology that he needs to schedule follow up appt before refill can be given     #cocaine abuse  #nicotine use disorder  - Utox pending collection  - Utox + cocaine and marijuana on 6/21/23  - smoking and cocaine cessation advised and recommended. Spent 5 minutes discussing smoking cessation.   - Nicotine patch ordered PRN      Scheduled outpatient appointments in system:   No future appointments.  ---------------------------------------------------------------------------------------------------  Subjective   No events. Pt reports pain is controlled. No new paraesthesias/weaknes or other neuro changes. No n/v. Has appetite and awaiting his meal. Reports he had a mechanical fall on a ladder, no issues with balance or gait previously.      ---------------------------------------------------------------------------------------------------  Objective   Last Recorded Vitals  Blood pressure 117/69, pulse 58, temperature 36 °C (96.8 °F), temperature source Temporal, resp. rate 16, height 1.778 m (5' 10\"), weight 97.5 kg (215 lb), SpO2 97%.  Intake/Output last 3 Shifts:  No intake/output data recorded.    Physical Exam  Vitals and nursing note reviewed.   Constitutional:       General: He is not in acute distress.     Appearance: Normal appearance. He is not ill-appearing or toxic-appearing.   HENT:      Head: Normocephalic and atraumatic.      Mouth/Throat:      Mouth: Mucous membranes are moist.   Eyes:      General: No scleral icterus.     Extraocular Movements: Extraocular movements intact.      Conjunctiva/sclera: Conjunctivae normal.   Cardiovascular:      Rate and Rhythm: Normal rate and regular rhythm.      Heart sounds: S1 normal and S2 normal. No murmur heard.  Pulmonary:      Effort: Pulmonary effort is normal. No respiratory distress.      Breath sounds: No wheezing, rhonchi or rales.   Abdominal:      General: Bowel sounds " are normal. There is no distension.      Palpations: Abdomen is soft.      Tenderness: There is no abdominal tenderness. There is no guarding or rebound.   Musculoskeletal:         General: No swelling or deformity.      Cervical back: Neck supple.      Comments: Distal RUE in splint, neurovascularly intact   Skin:     General: Skin is warm and dry.      Findings: No rash.   Neurological:      General: No focal deficit present.      Mental Status: He is alert. Mental status is at baseline.   Psychiatric:         Mood and Affect: Mood normal.         Relevant Results  Lab Results   Component Value Date    WBC 8.6 11/06/2024    HGB 13.1 (L) 11/06/2024    HCT 39.3 (L) 11/06/2024    MCV 89 11/06/2024     11/06/2024      Lab Results   Component Value Date    GLUCOSE 102 (H) 11/06/2024    CALCIUM 8.4 (L) 11/06/2024     11/06/2024    K 3.8 11/06/2024    CO2 23 11/06/2024     (H) 11/06/2024    BUN 15 11/06/2024    CREATININE 0.67 11/06/2024     Scheduled medications  amLODIPine, 5 mg, oral, Daily  finasteride, 5 mg, oral, Daily  sennosides-docusate sodium, 2 tablet, oral, BID  tamsulosin, 0.8 mg, oral, Nightly      Continuous medications     PRN medications  PRN medications: acetaminophen, melatonin, nicotine, oxyCODONE, oxyCODONE    Gene Fermin MD

## 2024-11-06 NOTE — PROGRESS NOTES
Occupational Therapy                 Therapy Communication Note    Patient Name: Grover Bauer  MRN: [id number]  Department: [facility]  Room: [facility]  Today's Date: 11/6/2024     Discipline: Occupational Therapy    Missed Visit Reason: Missed Visit Reason:  (Attempt x2 825AM and 1123AM. Pt off floor at OR. Will re-attempt at later time as appropriate.)    Missed Time: Attempt    Comment:

## 2024-11-06 NOTE — HOSPITAL COURSE
Grover Bauer is a 69 y.o. male with a PMH of cocaine abuse, tobacco use disorder, and BPH. Mr. Bauer presented to ED via EMS for further evaluation after he fell 12 ft off a ladder. Pt had noted deformity of right forearm with abrasions present on his right lower leg and abdomen. Splint was placed to right arm in field by EMS.  Labs obtained on admit notable for mild anemia. Imaging obtained while in the ED showed midshaft both-bone forearm fracture with displacement and overriding. 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 Ortho consulted, planning R forearm ORIF 11/6.

## 2024-11-06 NOTE — OP NOTE
Open Reduction Internal Fixation Radius and Ulna (R) Operative Note     Date: 2024  OR Location: [facility] OR    Name: Grover Bauer, : 1955, Age: 69 y.o., MRN: [id number], Sex: male    Diagnosis  Pre-op Diagnosis      * Closed fracture of distal ends of right radius and ulna, initial encounter [S52.501A, S52.601A] Post-op Diagnosis     * Closed fracture of distal ends of right radius and ulna, initial encounter [S52.501A, S52.601A]     Procedures  Open Reduction Internal Fixation Radius and Ulna  06732 - MT OPEN TX RADIAL&ULNAR SHAFT FX W/FIXJ RADIUS&ULNA      Surgeons      * Cayden Garcia - Primary    Resident/Fellow/Other Assistant:  Surgeons and Role:     * Sascha Meza MD - Resident - Assisting     * Joel Darby MD - Resident - Assisting    Staff:   Circulator: Chayito  Scrub Person: Hitesh Feliz Scrub: Declanseem  Tray Circulator: Hitesh    Anesthesia Staff: Anesthesiologist: Ferdinand Garcia MD  C-AA: ROSELYN Herrera    Procedure Summary  Anesthesia: General  ASA: III  Estimated Blood Loss: 50 mL  Intra-op Medications:   Administrations occurring from 0800 to 1115 on 24:   Medication Name Total Dose   sodium chloride 0.9 % irrigation solution 2,000 mL   vancomycin (Vancocin) vial for injection 1 g   ceFAZolin (Ancef) vial 1 g 2 g   dexAMETHasone (Decadron) injection 4 mg/mL 4 mg   electrolyte-A (Plasmalyte-A) Cannot be calculated   fentaNYL (Sublimaze) injection 50 mcg/mL 100 mcg   HYDROmorphone (Dilaudid) injection 1 mg/mL 1 mg   lidocaine (cardiac) injection 2% prefilled syringe 50 mg   midazolam PF (Versed) injection 1 mg/mL 2 mg   ondansetron (Zofran) 2 mg/mL injection 4 mg   propofol (Diprivan) injection 10 mg/mL 200 mg   rocuronium (ZeMuron) 50 mg/5 mL injection 90 mg   sugammadex (Bridion) 200 mg/2 mL injection 200 mg   tranexamic acid (Cyklokapron) injection 1,000 mg              Anesthesia Record               Intraprocedure I/O Totals          Intake     electrolyte-A (Plasmalyte-A) 900.00 mL    Tranexamic Acid 0.00 mL    The total shown is the total volume documented since Anesthesia Start was filed.    Total Intake 900 mL       Output    Est. Blood Loss 25 mL    Total Output 25 mL       Net    Net Volume 875 mL          Specimen: No specimens collected              Drains and/or Catheters: * None in log *    Tourniquet Times:     Total Tourniquet Time Documented:  Arm - Upper (Right) - 112 minutes  Total: Arm - Upper (Right) - 112 minutes      Implants:  Implants       Type Name Action Serial No.      Screw PLATE LCP TUBULAR 1/3 69MM 6H - SN/A - [id number] Implanted N/A     Screw SCREW CORTEX SELF TAPPING 2.7X10 - [id number] Implanted      Screw SCREW CORTEX SELF TAPPING 2.7X12 - SN/A - [id number] Implanted N/A     Screw PLATE LCP 3.5 X 98MM 7H - SN/A - [id number] Implanted N/A     Screw SCREW, CORTICAL, SELF-TAPPING, 3.5 X 16 MM, STAINLESS STEEL - [id number] Implanted      Screw SCREW, CORTICAL, SELF-TAPPING, 3.5 X 18 MM, STAINLESS STEEL - [id number] Implanted      Screw PLATE LCP 3.5 X 124 9H - [id number] Implanted               Findings: Good fracture reduction and safe implant placement at end of case. Radial artery and radial sensory nerve also determined to be intact and uninjured at end of case    Indications For Procedure:  Grover Bauer is an 69 y.o. male who fell off a 12 foot ladder sustaining R both bone forearm fracture. The orthopedic team evaluated the patient and a decision was made to offer operative management. Informed consent was obtained after discussing the risks, benefits, and alternatives to the procedure.  Risks include pain, bleeding, infection, damage to nearby structures, malunion, nonunion, hardware complications, need for further procedures, blood clots, anesthesia risks, and death.  Decision was made to proceed.  The patient was then brought to the preoperative holding area on the day of surgery.     Procedure Detail:  The  patient was met in the preoperative holding area where their identity was confirmed and the operative extremity was marked. The patient was taken back to the operating room where a preinduction timeout was performed which confirmed the patient's identity, procedure to be performed, correct operative site, availability of imaging and implants, allergies, and preoperative antibiotics. Everyone present was in agreement. They were placed under general anesthesia without complication. The patient was transferred to the operating table and placed in the supine position. All bony prominences were well-padded. The patient's right upper extremity was then prepped and draped in the usual sterile fashion. A preprocedure timeout was performed which again confirmed the patient's identity, procedure to be performed, and correct operative site.  Once again, everyone present was in agreement. Preoperative antibiotics were administered.     Fluoroscopy was used to identify the fracture site and incision centered over the FCR tendon distally extending towards the distal biceps tendon proximally was made with 15 blade.  Careful dissection over the FCR sheath was made and dissecting scissors were used to develop a plane between the radial artery and the brachioradialis muscle.  Branches of the radial artery heading towards the brachioradialis were coagulated.  Continue dissection brought us to the radial shaft where a knife was used to sharply subperiosteally elevate the distal radial aspect of the pronator teres to better expose the radial shaft.  We additionally cleaned the fracture edges with a combination of knife, curette, and irrigation and obtained a reduction using 2 lobster clamps proximally distally to the fracture.  2.7 mm 4-hole one third tubular plate was used with 2 unicortical screws proximally and 2 unicortical screws distally on the radial aspect of the radius centered at the fracture to stabilize a provisional  reduction.  We next placed a 3.5 mm 7-hole Synthes LCP plate centered at the fracture, with 3 holes proximally and 3 holes distally available.  We first placed a screw distally centered in the nonlocking hole.  We then placed a 6 screw proximally eccentrically to compress the transverse radial shaft fracture.  The remainder of the proximal distal holes were placed.  Only cortical screws were used.  Anatomic reduction and appropriate place placement was confirmed under fluoroscopy.    We next turned our attention to the ulna.  The forearm was bent up to approximately 120 degrees and incision was made centered over the 2 ulnar shaft fractures.  Sharp dissection was taken between the FCU and ECU footprints this brought us to the ulna.  Lobster clamps proximally distally allowed us to achieve reduction of this fracture and a 9 hole 3.5 mm Synthes LCP plate was applied to the ulna.  We noted that the proximal ulna fracture was relatively comminuted, and the more distal ulna fracture was nondisplaced.  We are able to place this plate on the volar surface of the ulna and placed 2 cortical screws distal to the most distal fracture, 1 interfragmentary screw between the fractures, and 3 screws proximal to the displaced fracture approximately.  This was done under fluoroscopy and we noted anatomic alignment after plate placement along with full pronation supination of the forearm without blocks to range of motion.  We additionally noted that the plate was not too prominent on the ulnar so that it would hopefully not bother the patient when he placed his arm in a resting position on a hard surface    Both wounds were copiously irrigated and vancomycin/tobramycin powders were placed in both wounds.  Fascia was left open for both wounds.  Subcutaneous layer was closed with 2-0 Vicryl and skin was closed with 3-0 nylon in simple interrupted fashion.  Sterile dressing was applied and a volar slab splint was placed       The drapes  were taken down and the patient was awoken from anesthesia without complication. They were transferred back to their hospital bed and taken to PACU in stable condition.     Postoperative Plan:  The patient will be nonweightbearing to the right upper extremity at this time.  Grover Bauer  will receive postoperative antibiotics.  We will defer dvt prophylaxis to the primary team as this is an isolated upper extremity fracture    Complications:  None; patient tolerated the procedure well.    Disposition: PACU - hemodynamically stable.  Condition: stable     Attending Attestation: I was present and scrubbed for the key portions of the procedure.    Cayden Garcia  Phone Number: [phone]

## 2024-11-06 NOTE — ANESTHESIA PROCEDURE NOTES
Airway  Date/Time: 11/6/2024 8:33 AM  Urgency: elective      Staffing  Performed: ROSELYN   Authorized by: Ferdinand Garcia MD    Performed by: ROSELYN Herrera  Patient location during procedure: OR    Indications and Patient Condition  Indications for airway management: anesthesia  Spontaneous Ventilation: absent  Sedation level: deep  Preoxygenated: yes  Patient position: sniffing  Mask difficulty assessment: 1 - vent by mask    Final Airway Details  Final airway type: endotracheal airway      Successful airway: ETT  Cuffed: yes   Successful intubation technique: direct laryngoscopy  Facilitating devices/methods: intubating stylet  Blade: Freedom  Blade size: #4  ETT size (mm): 7.0  Cormack-Lehane Classification: grade I - full view of glottis  Placement verified by: chest auscultation   Measured from: gums  ETT to gums (cm): 22  Number of attempts at approach: 1

## 2024-11-06 NOTE — ANESTHESIA POSTPROCEDURE EVALUATION
Patient: Grover Bauer    Procedure Summary       Date: 11/06/24 Room / Location: [facility] OR 01 / [facility] OR    Anesthesia Start:  Anesthesia Stop:     Procedure: Open Reduction Internal Fixation Radius and Ulna (Right: Arm Lower) Diagnosis:       Closed fracture of distal ends of right radius and ulna, initial encounter      (Closed fracture of distal ends of right radius and ulna, initial encounter [S52.501A, S52.601A])    Surgeons: Cayden Garcia MD Responsible Provider:     Anesthesia Type: general ASA Status: Not recorded            Anesthesia Type: general    Vitals Value Taken Time   /77 11/06/24 0802   Temp  11/06/24 0802   Pulse 67 11/06/24 0802   Resp 12 11/06/24 0802   SpO2 93 11/06/24 0802       Anesthesia Post Evaluation    Patient location during evaluation: bedside  Patient participation: complete - patient participated  Level of consciousness: sleepy but conscious  Pain score: 2  Pain management: adequate  Airway patency: patent  Cardiovascular status: acceptable, hemodynamically stable and stable  Respiratory status: acceptable, face mask and spontaneous ventilation  Hydration status: acceptable  Postoperative Nausea and Vomiting: none        No notable events documented.

## 2024-11-06 NOTE — PROGRESS NOTES
Physical Therapy                 Therapy Communication Note    Patient Name: Grover Bauer  MRN: [id number]  Department: [facility]  Room: [facility]  Today's Date: 11/6/2024     Discipline: Physical Therapy    Missed Visit Reason: Missed Visit Reason:  (0841. Pt in OR. Will reattempt following procedure as appropriate.)    Missed Time: Attempt    Comment:

## 2024-11-06 NOTE — PROGRESS NOTES
"Orthopaedic Surgery Progress Note    S: Evaluated in immediate postoperative period. Pain well controlled considering recent surgery. Denies chest pain, shortness of breath, or fevers.    O:  /86   Pulse 70   Temp 36 °C (96.8 °F) (Temporal)   Resp 12   Ht 1.778 m (5' 10\")   Wt 97.5 kg (215 lb)   SpO2 95%   BMI 30.85 kg/m²     Gen: arousable, NAD, appropriately conversational  Cardiac: RRR to peripheral palpation  Resp: nonlabored on RA  GI: soft, nondistended    MSK:  Right upper extremity:   -Volar slab splint clean/dry/intact  -Fires axillary/AIN/PIN/ulnar distributions  -SILT axillary/radial/median/ulnar distributions  -Hand warm, well perfused  -Radial pulse not accessible due to splint, however cap refill brisk  -Compartments soft and compressible      A/P: 69 y.o. male s/p ORIF R both botne forearm fracture on 11/6/24 with Dr. Garcia.      Plan:  - Weight bearing: NWB RUE in splint  - DVT PPx: SCDs, DVT chemoppx per primary  - Diet: OK for regular diet from orthopedic perspective  - Pain: Multimodal pain regimen per primary  - Antibiotics: Ancef 2g q8hr x 3 doses  - Dressing:  maintain splint until follow up  - Drain: None  - PT/OT consult    Sascha Meza MD  PGY-4 Orthopedic Surgery  [facility]  Available by Epic Message     While admitted, this patient will be followed by the Ortho Trauma Team. Please contact below residents with any questions (available via Epic Chat).     First call: Savanna Heard, PGY-1  Second call: Flavio Anand, PGY-2  Third call: Margo Manrique, PGY-3  "

## 2024-11-07 ENCOUNTER — PHARMACY VISIT (OUTPATIENT)
Dept: PHARMACY | Facility: CLINIC | Age: 69
End: 2024-11-07
Payer: COMMERCIAL

## 2024-11-07 VITALS
TEMPERATURE: 97.9 F | RESPIRATION RATE: 18 BRPM | HEIGHT: 70 IN | BODY MASS INDEX: 30.78 KG/M2 | DIASTOLIC BLOOD PRESSURE: 92 MMHG | HEART RATE: 71 BPM | SYSTOLIC BLOOD PRESSURE: 154 MMHG | WEIGHT: 215 LBS | OXYGEN SATURATION: 96 %

## 2024-11-07 PROBLEM — S52.501A CLOSED FRACTURE OF LOWER END OF RADIUS WITH ULNA, RIGHT, INITIAL ENCOUNTER: Status: RESOLVED | Noted: 2024-11-05 | Resolved: 2024-11-07

## 2024-11-07 PROBLEM — S52.601A CLOSED FRACTURE OF LOWER END OF RADIUS WITH ULNA, RIGHT, INITIAL ENCOUNTER: Status: RESOLVED | Noted: 2024-11-05 | Resolved: 2024-11-07

## 2024-11-07 LAB
ALBUMIN SERPL BCP-MCNC: 3.9 G/DL (ref 3.4–5)
ANION GAP SERPL CALC-SCNC: 16 MMOL/L (ref 10–20)
BUN SERPL-MCNC: 9 MG/DL (ref 6–23)
CALCIUM SERPL-MCNC: 8.9 MG/DL (ref 8.6–10.6)
CHLORIDE SERPL-SCNC: 105 MMOL/L (ref 98–107)
CO2 SERPL-SCNC: 24 MMOL/L (ref 21–32)
CREAT SERPL-MCNC: 0.54 MG/DL (ref 0.5–1.3)
EGFRCR SERPLBLD CKD-EPI 2021: >90 ML/MIN/1.73M*2
ERYTHROCYTE [DISTWIDTH] IN BLOOD BY AUTOMATED COUNT: 14.8 % (ref 11.5–14.5)
GLUCOSE SERPL-MCNC: 110 MG/DL (ref 74–99)
HCT VFR BLD AUTO: 43.2 % (ref 41–52)
HGB BLD-MCNC: 14.3 G/DL (ref 13.5–17.5)
MCH RBC QN AUTO: 29.1 PG (ref 26–34)
MCHC RBC AUTO-ENTMCNC: 33.1 G/DL (ref 32–36)
MCV RBC AUTO: 88 FL (ref 80–100)
NRBC BLD-RTO: 0 /100 WBCS (ref 0–0)
PHOSPHATE SERPL-MCNC: 2.8 MG/DL (ref 2.5–4.9)
PLATELET # BLD AUTO: 281 X10*3/UL (ref 150–450)
POTASSIUM SERPL-SCNC: 3.7 MMOL/L (ref 3.5–5.3)
RBC # BLD AUTO: 4.92 X10*6/UL (ref 4.5–5.9)
SODIUM SERPL-SCNC: 141 MMOL/L (ref 136–145)
WBC # BLD AUTO: 10.8 X10*3/UL (ref 4.4–11.3)

## 2024-11-07 PROCEDURE — 97161 PT EVAL LOW COMPLEX 20 MIN: CPT | Mod: GP

## 2024-11-07 PROCEDURE — 36415 COLL VENOUS BLD VENIPUNCTURE: CPT | Performed by: STUDENT IN AN ORGANIZED HEALTH CARE EDUCATION/TRAINING PROGRAM

## 2024-11-07 PROCEDURE — 80069 RENAL FUNCTION PANEL: CPT | Performed by: STUDENT IN AN ORGANIZED HEALTH CARE EDUCATION/TRAINING PROGRAM

## 2024-11-07 PROCEDURE — 97165 OT EVAL LOW COMPLEX 30 MIN: CPT | Mod: GO

## 2024-11-07 PROCEDURE — 2500000001 HC RX 250 WO HCPCS SELF ADMINISTERED DRUGS (ALT 637 FOR MEDICARE OP): Performed by: NURSE PRACTITIONER

## 2024-11-07 PROCEDURE — 85027 COMPLETE CBC AUTOMATED: CPT | Performed by: STUDENT IN AN ORGANIZED HEALTH CARE EDUCATION/TRAINING PROGRAM

## 2024-11-07 PROCEDURE — RXMED WILLOW AMBULATORY MEDICATION CHARGE

## 2024-11-07 PROCEDURE — 99239 HOSP IP/OBS DSCHRG MGMT >30: CPT | Performed by: STUDENT IN AN ORGANIZED HEALTH CARE EDUCATION/TRAINING PROGRAM

## 2024-11-07 PROCEDURE — G0378 HOSPITAL OBSERVATION PER HR: HCPCS

## 2024-11-07 RX ORDER — ACETAMINOPHEN 325 MG/1
650 TABLET ORAL EVERY 8 HOURS
Qty: 84 TABLET | Refills: 0 | Status: SHIPPED | OUTPATIENT
Start: 2024-11-07 | End: 2024-11-21

## 2024-11-07 RX ORDER — NAPROXEN SODIUM 220 MG/1
81 TABLET, FILM COATED ORAL 2 TIMES DAILY
Qty: 84 TABLET | Refills: 0 | Status: SHIPPED | OUTPATIENT
Start: 2024-11-07 | End: 2024-12-19

## 2024-11-07 RX ORDER — AMOXICILLIN 250 MG
2 CAPSULE ORAL 2 TIMES DAILY
Qty: 56 TABLET | Refills: 0 | Status: SHIPPED | OUTPATIENT
Start: 2024-11-07 | End: 2024-11-21

## 2024-11-07 RX ORDER — IBUPROFEN 200 MG
1 TABLET ORAL DAILY PRN
Qty: 28 PATCH | Refills: 0 | Status: SHIPPED | OUTPATIENT
Start: 2024-11-07 | End: 2024-12-05

## 2024-11-07 RX ORDER — OXYCODONE HYDROCHLORIDE 5 MG/1
5 TABLET ORAL EVERY 6 HOURS PRN
Qty: 12 TABLET | Refills: 0 | Status: SHIPPED | OUTPATIENT
Start: 2024-11-07 | End: 2024-11-10

## 2024-11-07 RX ORDER — OXYCODONE HYDROCHLORIDE 5 MG/1
5 TABLET ORAL EVERY 6 HOURS PRN
Qty: 15 TABLET | Refills: 0 | Status: SHIPPED | OUTPATIENT
Start: 2024-11-07 | End: 2024-11-11

## 2024-11-07 ASSESSMENT — COGNITIVE AND FUNCTIONAL STATUS - GENERAL
HELP NEEDED FOR BATHING: A LITTLE
DAILY ACTIVITIY SCORE: 24
MOBILITY SCORE: 24
MOBILITY SCORE: 22
TURNING FROM BACK TO SIDE WHILE IN FLAT BAD: A LITTLE
EATING MEALS: A LITTLE
DRESSING REGULAR UPPER BODY CLOTHING: A LITTLE
MOVING FROM LYING ON BACK TO SITTING ON SIDE OF FLAT BED WITH BEDRAILS: A LITTLE
DAILY ACTIVITIY SCORE: 21

## 2024-11-07 ASSESSMENT — PAIN - FUNCTIONAL ASSESSMENT
PAIN_FUNCTIONAL_ASSESSMENT: 0-10

## 2024-11-07 ASSESSMENT — PAIN DESCRIPTION - DESCRIPTORS
DESCRIPTORS: DULL
DESCRIPTORS: DULL

## 2024-11-07 ASSESSMENT — PAIN DESCRIPTION - LOCATION: LOCATION: ARM

## 2024-11-07 ASSESSMENT — PAIN SCALES - GENERAL
PAINLEVEL_OUTOF10: 7
PAINLEVEL_OUTOF10: 8
PAINLEVEL_OUTOF10: 9
PAINLEVEL_OUTOF10: 8

## 2024-11-07 ASSESSMENT — ACTIVITIES OF DAILY LIVING (ADL)
BATHING_ASSISTANCE: MINIMAL
ADL_ASSISTANCE: INDEPENDENT

## 2024-11-07 NOTE — PROGRESS NOTES
Physical Therapy    Physical Therapy Evaluation    Patient Name: Grover Bauer  MRN: [id number]  Department: [place] [address]  Room: [address] [place] [address]  Today's Date: 11/7/2024   Time Calculation  Start Time: 1012  Stop Time: 1021  Time Calculation (min): 9 min    Assessment/Plan   PT Assessment  PT Assessment Results: Pain  Rehab Prognosis: Excellent  Barriers to Discharge: none  Evaluation/Treatment Tolerance: Patient tolerated treatment well  Medical Staff Made Aware: Yes  Strengths: Ability to acquire knowledge, Attitude of self, Housing layout, Premorbid level of function  End of Session Communication: Bedside nurse  Assessment Comment: Pt is a 69 yom who presented following a fall off ladder, now s/p ORIF R forearm. Pt completed sit<>stand and ambulation independently, with no assistive device, and no acute LOB. Pt declined stair training prior to discharge. Pt is compliant with NWB R UE status and demos no further acute PT needs. Will discharge PT order, pt is in agreement.  End of Session Patient Position: Up in chair, Alarm off, not on at start of session    IP OR SWING BED PT PLAN  Inpatient or Swing Bed: Inpatient  PT Plan  Treatment/Interventions:  (none)  PT Plan: PT Eval only  PT Eval Only Reason: No acute PT needs identified  PT Frequency: PT eval only  PT Discharge Recommendations: No further acute PT, No PT needed after discharge  Equipment Recommended upon Discharge:  (none)  PT Recommended Transfer Status: Independent  PT - OK to Discharge: Yes    Subjective   General Visit Information:  General  Reason for Referral: Pt presented following fall off ladder, now s/p ORIF R forearm on 11/6/24 with Dr. Garcia  Past Medical History Relevant to Rehab: cocaine abuse, tobacco use disorder, and BPH  Family/Caregiver Present: No  Prior to Session Communication: Bedside nurse  Patient Position Received: Up in chair, Alarm off, not on at start of session  Preferred Learning Style: verbal  General Comment: Pt received  seated in bedside chair, pleasant and agreeable to participate in session    Home Living:  Home Living  Type of Home: House  Lives With: Significant other  Home Adaptive Equipment: None  Home Layout: One level  Home Access: Stairs to enter with rails  Entrance Stairs-Number of Steps: 6  Bathroom Shower/Tub: Tub/shower unit  Bathroom Equipment: None    Prior Level of Function:  Prior Function Per Pt/Caregiver Report  Level of [place]: Independent with ADLs and functional transfers, Independent with homemaking with ambulation  ADL Assistance: Independent  Homemaking Assistance: Independent  Ambulatory Assistance: Independent (community ambulator, no AD, denied other falls)  Vocational: Retired  Prior Function Comments: +drives    Precautions:  Precautions  UE Weight Bearing Status: Right Non-Weight Bearing  Medical Precautions: Fall precautions  Precautions Comment: Sling present though pt refusing to don sling prior to mobilizing       Objective   Pain:  Pain Assessment  Pain Assessment: 0-10  0-10 (Numeric) Pain Score: 8  Pain Type: Surgical pain  Pain Location: Arm  Pain Orientation: Right    Cognition:  Cognition  Overall Cognitive Status: Within Functional Limits  Orientation Level: Oriented X4    General Assessments:  Activity Tolerance  Endurance: Endurance does not limit participation in activity  Early Mobility/Exercise Safety Screen: Proceed with mobilization - No exclusion criteria met    Postural Control  Postural Control: Within Functional Limits    Static Sitting Balance  Static Sitting-Level of Assistance: Independent  Dynamic Sitting Balance  Dynamic Sitting-Level of Assistance: Independent    Static Standing Balance  Static Standing-Level of Assistance: Independent  Dynamic Standing Balance  Dynamic Standing-Level of Assistance: Independent    Functional Assessments:  Bed Mobility  Bed Mobility: No (Pt seated in chair pre/post session)    Transfers  Transfer: Yes  Transfer 1  Transfer From 1: Sit  to, Stand to  Transfer to 1: Stand, Sit  Technique 1: Sit to stand, Stand to sit  Transfer Device 1:  (none)  Transfer Level of Assistance 1: Independent  Trials/Comments 1: Compliant with NWB R UE    Ambulation/Gait Training  Ambulation/Gait Training Performed: Yes  Ambulation/Gait Training 1  Surface 1: Level tile  Device 1: No device  Assistance 1: Independent  Quality of Gait 1:  (no significant deviations noted)  Comments/Distance (ft) 1: 60 ft, pt declined further progression of mobility    Stairs  Stairs: No (Pt declined stair training, reports no issues anticipated)    Extremity/Trunk Assessments:  RLE   RLE : Within Functional Limits  LLE   LLE : Within Functional Limits    Outcome Measures:  [facility] Basic Mobility  Turning from your back to your side while in a flat bed without using bedrails: A little  Moving from lying on your back to sitting on the side of a flat bed without using bedrails: A little  Moving to and from bed to chair (including a wheelchair): None  Standing up from a chair using your arms (e.g. wheelchair or bedside chair): None  To walk in hospital room: None  Climbing 3-5 steps with railing: None  Basic Mobility - Total Score: 22    Encounter Problems       Encounter Problems (Active)       Pain - Adult          Safety       LTG - Patient will adhere to hip precautions during ADL's and transfers       Start:  11/06/24            LTG - Patient will demonstrate safety requirements appropriate to situation/environment       Start:  11/06/24            LTG - Patient will utilize safety techniques       Start:  11/06/24            STG - Patient locks brakes on wheelchair       Start:  11/06/24            STG - Patient uses call light consistently to request assistance with transfers       Start:  11/06/24            STG - Patient uses gait belt during all transfers       Start:  11/06/24            Goal 1       Start:  11/06/24            Goal 2       Start:  11/06/24            Goal 3        Start:  11/06/24                   Education Documentation  Precautions, taught by Angela Lim PT at 11/7/2024 10:32 AM.  Learner: Patient  Readiness: Acceptance  Method: Explanation, Demonstration  Response: Verbalizes Understanding, Demonstrated Understanding  Comment: TAIWO WOLFE    Mobility Training, taught by Angela Lim, PT at 11/7/2024 10:32 AM.  Learner: Patient  Readiness: Acceptance  Method: Explanation, Demonstration  Response: Verbalizes Understanding, Demonstrated Understanding  Comment: TAIWO WOLFE    Education Comments  No comments found.          11/07/24 at 10:33 AM - Angela Lim PT

## 2024-11-07 NOTE — NURSING NOTE
Pt. Medication delivered from Meds to Bed oxycodone, ASA, Acetaminophen, stool softener. Heplock removed tip intact. Sling for rt. Arm given  to pt. Discharge Summary reviewed with pt. And is aware of his medication (Calcium with Maria Teresa D) he has to  @ his pharmacy. Pt. Ambulated of floor  with personal belongs.

## 2024-11-07 NOTE — DISCHARGE SUMMARY
Date of Admission: 11/5/2024    Date of Discharge: 11/7/2024    Discharge Diagnosis  Closed fracture of lower end of radius with ulna, right, initial encounter  Elevated blood pressure  Mechanical fall      Discharge Meds     Your medication list        START taking these medications        Instructions Last Dose Given Next Dose Due   acetaminophen 325 mg tablet  Commonly known as: Tylenol      Take 2 tablets (650 mg) by mouth every 8 hours for 14 days.       aspirin 81 mg chewable tablet      Chew 1 tablet (81 mg) 2 times a day. For prophylaxis against clots       calcium carbonate-vitamin D3 500 mg-5 mcg (200 unit) tablet      Take 1 tablet by mouth once daily. To promote bone healing       nicotine 14 mg/24 hr patch  Commonly known as: Nicoderm CQ      Place 1 patch over 24 hours on the skin once daily as needed (tobacco cravings) for up to 28 days.       oxyCODONE 5 mg immediate release tablet  Commonly known as: Roxicodone      Take 1 tablet (5 mg) by mouth every 6 hours if needed for severe pain (7 - 10) for up to 4 days.       oxyCODONE 5 mg immediate release tablet  Commonly known as: Roxicodone      Take 1 tablet (5 mg) by mouth every 6 hours if needed for severe pain (7 - 10) for up to 3 days.       sennosides-docusate sodium 8.6-50 mg tablet  Commonly known as: Mercedes-Colace      Take 2 tablets by mouth 2 times a day for 14 days.              CONTINUE taking these medications        Instructions Last Dose Given Next Dose Due   finasteride 5 mg tablet  Commonly known as: Proscar           sildenafil 100 mg tablet  Commonly known as: Viagra           tamsulosin 0.4 mg 24 hr capsule  Commonly known as: Flomax      TAKE 2 CAPSULES BY MOUTH DAILY 1/2 HOUR AFTER SAME MEAL OR AT BEDTIME WITH SNACK                 Where to Get Your Medications        These medications were sent to [facility]/pharmacy #[id number] - [place], [place] - [address] [address]      Phone: [phone]   calcium  carbonate-vitamin D3 500 mg-5 mcg (200 unit) tablet       [facility]  [address], [address], [facility] [address]      Hours: 8AM to 6PM Mon-Fri, 8AM to 4PM Sat, 9AM to 1PM Sun Phone: [phone]   acetaminophen 325 mg tablet  aspirin 81 mg chewable tablet  nicotine 14 mg/24 hr patch  sennosides-docusate sodium 8.6-50 mg tablet       You can get these medications from any pharmacy    Bring a paper prescription for each of these medications  oxyCODONE 5 mg immediate release tablet  oxyCODONE 5 mg immediate release tablet         Test Results Pending At Discharge  Pending Labs       No current pending labs.            Hospital Course  Grover Bauer is a 69 y.o. male with a PMH of cocaine abuse, tobacco use disorder, and BPH. Mr. Bauer presented to ED via EMS for further evaluation after he fell 12 ft off a ladder. Pt had noted deformity of right forearm with abrasions present on his right lower leg and abdomen. Splint was placed to right arm in field by EMS.  Labs obtained on admit notable for mild anemia. Imaging obtained while in the ED showed midshaft both-bone forearm fracture with displacement and overriding. 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 Ortho consulted, planning R forearm ORIF 11/6. . Pt tolerated surgery, no complications. Neurovascularly intact. Plan for 2 week post-op visit with orthopedics. Pt to continue dvt px (asa 81mg bid) and calcium/vit d supplementation for 6 weeks. Given pcp referral. Pt will need to have bp recheck for elevated bp and management in the op setting. Pt given a script for physical therapy. Discharge plan of care discussed, education and counseling provided involving active problem care plan, warning signs,  risks/benefits of new medications or medication  changes, follow-up care and testing.  Patient advised to follow-up with her primary care within 1 week of discharge or the next available for posthospitalization transition of care.  There was verbalized understanding and agreement with discharge care plan.    Pertinent Physical Exam At Time of Discharge  On the day of discharge, No acute distress, interactive, no increased work of breathing, regular rate and rhythm, abdomen soft/nontender, right arm in splint, neurovascularly intact.     Outpatient Follow-Up  No future appointments.  Pcp, orthopedics, physical therapy     Pt instructed to take all medications as prescribed.  Keep all follow-up appointments.  Contact their primary care physician with any questions or concerns that arise.  Come to the emergency department with worsening of your symptoms or any other medical emergency.    Time spent >30 minutes on discharge management.    Gene Fermin MD

## 2024-11-07 NOTE — CARE PLAN
The patient's goals for the shift include Pt. will be able to go home today.    The clinical goals for the shift include pt. will james able to have Bowel movement prior to discharge    Over the shift, the patient did not make progress toward the following goals. Barriers to progression include pt. Able to be discharge to home today.  Recommendations to address these barriers include pt. Given Senakot tablet and stools softeners to go home with.

## 2024-11-07 NOTE — PROGRESS NOTES
Occupational Therapy    Evaluation    Patient Name: Grover Bauer  MRN: [id number]  Today's Date: 11/7/2024  Room: [address] [place] [address]  Time Calculation  Start Time: 0840  Stop Time: 0901  Time Calculation (min): 21 min    Assessment  IP OT Assessment  OT Assessment: PATIENT IS AWARE OF PRECAUTIONS AND IS ABLE TO PERFORM ADL AND MOBILITY TASKS AT OR NEAR BASELINE. HE REPORTS 24* ASSISTANCE IS AVAILABLE FOR RPN ADL AND IADL TASKS. NO IP ACUTE OT NEEDS IDENTIFIED BUT OUTPATIENT OT IS RECOMMENDED.  Barriers to Discharge: None  Evaluation/Treatment Tolerance: Patient tolerated treatment well  Medical Staff Made Aware: Yes  End of Session Communication: Bedside nurse  End of Session Patient Position: Bed, 2 rail up, Alarm off, not on at start of session  Plan:  Inpatient Plan  No Skilled OT: No acute OT goals identified  OT Discharge Recommendations:  (OUTPATIENT OT INTERVENTION AS APPROPRIATE UPON ORTHO TEAM RECOMMENDATION)  OT Recommended Transfer Status:  (MOD I)  OT - OK to Discharge: Yes  OT Assessment  Barriers to Discharge: None  Evaluation/Treatment Tolerance: Patient tolerated treatment well  Medical Staff Made Aware: Yes    Subjective   Current Problem:  1. Closed fracture of lower end of radius with ulna, right, initial encounter  calcium carbonate-vitamin D3 500 mg-5 mcg (200 unit) tablet    oxyCODONE (Roxicodone) 5 mg immediate release tablet    sennosides-docusate sodium (Mercedes-Colace) 8.6-50 mg tablet    acetaminophen (Tylenol) 325 mg tablet    aspirin 81 mg chewable tablet    Referral to Physical Therapy    Referral to Primary Care    oxyCODONE (Roxicodone) 5 mg immediate release tablet    Referral to Orthopaedic Surgery      2. Closed fracture of distal ends of right radius and ulna, initial encounter  Case Request Operating Room: Open Reduction Internal Fixation Radius and Ulna    Case Request Operating Room: Open Reduction Internal Fixation Radius and Ulna      3. Tobacco use  nicotine (Nicoderm CQ) 14 mg/24  hr patch        General:  Reason for Referral: Presented to ED via EMS for further evaluation after he fell 12 ft off a ladder with noted deformity of right forearm and abrasions on his right lower leg and abdomen. Imaging obtained showed midshaft both-bone forearm fracture with displacement and overriding.  Past Medical History Relevant to Rehab: Cocaine abuse, tobacco use disorder, and BPH.  Prior to Session Communication: Bedside nurse  Patient Position Received: Bed, 2 rail up, Alarm off, not on at start of session  Family/Caregiver Present: No  General Comment: PATIENT RESTING IN SUPINE AT START OF SESSION. HE PLEASANTLY EXPRESSED FULL RECEPTIVITY TO PARTICIPATION IN OT SESSION.   Precautions:  Hearing/Visual Limitations: WFL/READERS  UE Weight Bearing Status: Right Non-Weight Bearing  Medical Precautions: Fall precautions  Splinting: RUE SPLINT WITH ACE WRAP (AND SHOULDER SLING; EDUCATED FOR PROPER TECHNIQUE TO DON/DOFF SLING/INDEPENDENT DURING RETURN DEMO)  Vital Signs:  Vital Signs (Past 2hrs)        Date/Time Vitals Session Patient Position Pulse Resp SpO2 BP MAP (mmHg)    11/07/24 0840 During OT  Lying  71  --  96 %  154/92  113                   Pain:  Pain Assessment  Pain Assessment: 0-10  0-10 (Numeric) Pain Score: 8  Pain Type: Surgical pain  Pain Location: Arm  Pain Orientation: Right  Pain Interventions: Repositioned (DURING ADL, TF, AND FXAL MOBILITY TASKS)  Response to Interventions: VERBALIZED/DEMONSTRATED UNDERSTANDIING    Objective   Cognition:  Overall Cognitive Status: Within Functional Limits  Orientation Level: Oriented X4  Attention: Within Functional Limits  Memory: Within Funtional Limits  Problem Solving: Within Functional Limits   Home Living:  Type of Home: House  Lives With: Significant other  Home Adaptive Equipment: None  Home Layout: One level (FIRST FLOOR OF A TWO FAMILY HOME)  Home Access: Stairs to enter with rails  Entrance Stairs-Rails: Both  Entrance Stairs-Number of Steps:  7  Bathroom Shower/Tub: Tub/shower unit  Bathroom Toilet: Standard  Bathroom Equipment: None   Prior Function:  Level of [place]: Independent with ADLs and functional transfers, Independent with homemaking with ambulation  Ambulatory Assistance: Independent  Vocational:  (REGGIE)  Hand Dominance: Right  IADL History:  Homemaking Responsibilities: Yes  Meal Prep Responsibility: Primary  Laundry Responsibility: Primary  Cleaning Responsibility: Primary  Bill Paying/Finance Responsibility: Primary  Shopping Responsibility: Primary   Responsibility: Primary  Leisure and Hobbies: MOVIES, DANCING, PET/KITTEN CARE, BOWLING  IADL Comments: REPORTS SIGNIFICANT OTHER PRESENT TO PROVIDE 24 PRN ASSISTANCE POST DISCHARGE  ADL:  Eating Assistance: Stand by  Eating Deficit:  (ABLE TO MANAGE PACKAGES/CONTAINERS USING RUE/DIGITS)  Grooming Assistance: Stand by  Bathing Assistance: Minimal  Bathing Deficit:  (ANTICIPATED)  UE Dressing Assistance: Stand by  UE Dressing Deficit:  (ANTICIPATED FOR CLOSURES)  LE Dressing Assistance: Independent  Toileting Assistance with Device: Independent  Activity Tolerance:  Endurance: Endurance does not limit participation in activity    Bed Mobility/Transfers: Bed Mobility  Bed Mobility: Yes  Bed Mobility 1  Bed Mobility 1: Supine to sitting, Sitting to supine  Level of Assistance 1: Independent  Functional Mobility  Functional Mobility Performed: Yes (MOD I IN BEDROOM < > BATHROOM NO AD)   and Transfers  Transfer: Yes  Transfer 1  Transfer From 1:  (MOD I < > EOB AND TOILET)  IADL's:   Homemaking Responsibilities: Yes  Meal Prep Responsibility: Primary  Laundry Responsibility: Primary  Cleaning Responsibility: Primary  Bill Paying/Finance Responsibility: Primary  Shopping Responsibility: Primary   Responsibility: Primary  Leisure and Hobbies: MOVIES, DANCING, PET/KITTEN CARE, BOWLING  IADL Comments: REPORTS SIGNIFICANT OTHER PRESENT TO PROVIDE 24 PRN ASSISTANCE POST  DISCHARGE  Vision: Vision - Basic Assessment  Current Vision: No visual deficits   and       Strength:  Strength Comments: LUE >/= 4+/5  Perception:     Coordination:  Movements are Fluid and Coordinated: Yes   Hand Function:  Hand Function  Gross Grasp: Functional  Coordination: Functional  Extremities: RUE   RUE : Within Functional Limits (EXCEPT FOREARM), LUE   LUE: Within Functional Limits,  , and      Outcome Measures: [facility] Daily Activity  Putting on and taking off regular lower body clothing: None  Bathing (including washing, rinsing, drying): A little  Putting on and taking off regular upper body clothing: A little  Toileting, which includes using toilet, bedpan or urinal: None  Taking care of personal grooming such as brushing teeth: None  Eating Meals: A little  Daily Activity - Total Score: 21  Brief Confusion Assessment Method (bCAM)  CAM Result: CAM -      ,     OT Adult Other Outcome Measures  4AT: 0        11/07/24 at 10:30 AM   Jessica Dubois OT   Rehab Office: [phone]

## 2024-11-07 NOTE — PROGRESS NOTES
Per MD, patient to discharge home today with scripts for outpatient physical therapy. Met with patient at bedside to discuss outpatient therapy. Patient in agreement. Patient provided with scripts for outpatient physical therapy and list of outpatient therapy locations. Discussed with patient that he would need to call and make appointment at outpatient therapy location of his choice and take prescription, photo id and insurance card with him to first appointment. Patient verbalized understanding.   Aissatou BLAIR, RN  Transitional Care Coordinator (TCC)  [phone]

## 2024-11-12 ENCOUNTER — APPOINTMENT (OUTPATIENT)
Dept: PRIMARY CARE | Facility: CLINIC | Age: 69
End: 2024-11-12
Payer: MEDICARE

## 2024-11-12 ENCOUNTER — APPOINTMENT (OUTPATIENT)
Dept: RADIOLOGY | Facility: HOSPITAL | Age: 69
End: 2024-11-12
Payer: MEDICARE

## 2024-11-12 ENCOUNTER — APPOINTMENT (OUTPATIENT)
Dept: ORTHOPEDIC SURGERY | Facility: HOSPITAL | Age: 69
End: 2024-11-12
Payer: MEDICARE

## 2024-11-12 ENCOUNTER — HOSPITAL ENCOUNTER (EMERGENCY)
Facility: HOSPITAL | Age: 69
Discharge: HOME | End: 2024-11-12
Payer: MEDICARE

## 2024-11-12 VITALS
HEIGHT: 69 IN | OXYGEN SATURATION: 99 % | DIASTOLIC BLOOD PRESSURE: 80 MMHG | RESPIRATION RATE: 16 BRPM | WEIGHT: 215 LBS | HEART RATE: 68 BPM | TEMPERATURE: 97.7 F | BODY MASS INDEX: 31.84 KG/M2 | SYSTOLIC BLOOD PRESSURE: 132 MMHG

## 2024-11-12 DIAGNOSIS — M79.601 PAIN OF RIGHT UPPER EXTREMITY: Primary | ICD-10-CM

## 2024-11-12 PROCEDURE — 73090 X-RAY EXAM OF FOREARM: CPT | Mod: RIGHT SIDE | Performed by: STUDENT IN AN ORGANIZED HEALTH CARE EDUCATION/TRAINING PROGRAM

## 2024-11-12 PROCEDURE — 73090 X-RAY EXAM OF FOREARM: CPT | Mod: RT

## 2024-11-12 PROCEDURE — 99284 EMERGENCY DEPT VISIT MOD MDM: CPT

## 2024-11-12 ASSESSMENT — COLUMBIA-SUICIDE SEVERITY RATING SCALE - C-SSRS
2. HAVE YOU ACTUALLY HAD ANY THOUGHTS OF KILLING YOURSELF?: NO
6. HAVE YOU EVER DONE ANYTHING, STARTED TO DO ANYTHING, OR PREPARED TO DO ANYTHING TO END YOUR LIFE?: NO
1. IN THE PAST MONTH, HAVE YOU WISHED YOU WERE DEAD OR WISHED YOU COULD GO TO SLEEP AND NOT WAKE UP?: NO

## 2024-11-12 ASSESSMENT — PAIN - FUNCTIONAL ASSESSMENT: PAIN_FUNCTIONAL_ASSESSMENT: 0-10

## 2024-11-12 ASSESSMENT — PAIN SCALES - GENERAL: PAINLEVEL_OUTOF10: 8

## 2024-11-12 NOTE — ED TRIAGE NOTES
Pt to ed with complaints of R arm pain. Pt has a surgery on his arm 5 days ago, and now is having pain where the cast is on affected arm. Pt denies loss of sensation in hand or arm.

## 2024-11-12 NOTE — CONSULTS
Orthopaedic Surgery Consult H&P    HPI:   Orthopaedic Problems/Injuries: postop pain  Other Injuries: none    69M (Cocaine use, Tobacco use, BPH) s/p ORIF R BBFFx w/ Dr. Garcia on 11/6.  He is presenting after a day of burning pain over his incisions. He denies getting his splint wet or damaged since his surgery. He denies numbness or tingling of his extremity.    PMH: per above/EMR  PSH: per above/EMR  SocHx:      -  Regular tobacco use      - Occasional EtOH use      - Regular cocaine use, last used cocaine 11/4 pm  FamHx:  Non-contributory to this patient's acute orthopaedic problem.   Allergies: Reviewed in EMR  Meds: Reviewed in EMR    ROS      - 14 point ROS negative except as above    Physical Exam:  Gen: AOx3, NAD  HEENT: normocephalic atraumatic  Psych: appropriate mood and affect  Resp: nonlabored breathing  Cardiac: Extremities WWP, RRR to peripheral palpation  Neuro: CN 2-12 grossly intact  Skin: no rashes    Right upper extremity:   -Volar slab splint worn but intact.  -Incisions c/d/i.   -Fires axillary/AIN/PIN/ulnar distributions  -SILT axillary/radial/median/ulnar distributions  -Hand warm, well perfused  -Palpable radial pulse, cap refill brisk  -Compartments soft and compressible    A full secondary exam was performed and all relevant findings discussed and noted above.    Imaging:  AP and lateral radiographs of the right forearm displays intact hardware.     Assessment:  Orthopaedic Problems/Injuries: postop pain    69M (Cocaine use, Tobacco use, BPH) s/p ORIF R BBFFx w/ Dr. Garcia on 11/6 p/w 1 day burning pain. NVI, splint tattered. XR w/ stable hardware. Splint removed, soft dressing    Plan:  - Patient splint was removed and replaced with a soft dressing.  His pain is likely incisional pain that should improve with time and removal of his splint.  He will maintain his current follow-up on the 26th with Dr. Garcia.  - No acute surgical intervention indicated  - WB: Nonweightbearing right  upper extremity in soft dressing  - DVT: Was discharged with aspirin, can continue  - Dispo: Appropriate for discharge from Ortho perspective.    Mauricio Garcia MD  PGY-1 Orthopaedic Surgery  On-call Resident    This patient was seen within 30 minutes of initial consult.  He was staffed with his orthopedic surgeon, Dr. Garcia

## 2024-11-12 NOTE — ED PROVIDER NOTES
HPI   Chief Complaint   Patient presents with    Arm Injury       HPI  This is a 69 year old male who had surgery on his right arm for closed fracture of distal ends of right radius and ulna with Dr. Garcia 5 days ago. He is here today with feeling as if electricity is going through his arm. He is neuro-vascularly intact with full sensation and full ROM in his fingers.   He also mentioned having a funny looking stool which was ivory color. He has had no other concerning GI complaints.       Patient History   Past Medical History:   Diagnosis Date    BPH with obstruction/lower urinary tract symptoms     Cocaine abuse      Past Surgical History:   Procedure Laterality Date    OTHER SURGICAL HISTORY  01/13/2015    Needle Biopsy Of Prostate     Family History   Family history unknown: Yes     Social History     Tobacco Use    Smoking status: Every Day     Types: Cigarettes    Smokeless tobacco: Never   Vaping Use    Vaping status: Never Used   Substance Use Topics    Alcohol use: Yes     Alcohol/week: 6.0 standard drinks of alcohol     Types: 2 Glasses of wine, 2 Cans of beer, 2 Shots of liquor per week    Drug use: Yes     Types: Cocaine, Marijuana       Physical Exam   ED Triage Vitals [11/12/24 0949]   Temperature Heart Rate Respirations BP   36.5 °C (97.7 °F) 68 16 132/80      Pulse Ox Temp Source Heart Rate Source Patient Position   99 % Tympanic -- --      BP Location FiO2 (%)     -- --       Physical Exam  Constitutional:       Appearance: Normal appearance.   HENT:      Head: Normocephalic and atraumatic.      Mouth/Throat:      Mouth: Mucous membranes are moist.   Eyes:      Extraocular Movements: Extraocular movements intact.      Pupils: Pupils are equal, round, and reactive to light.   Cardiovascular:      Rate and Rhythm: Normal rate and regular rhythm.   Pulmonary:      Effort: Pulmonary effort is normal.      Breath sounds: Normal breath sounds.   Abdominal:      Palpations: Abdomen is soft.    Musculoskeletal:         General: Signs of injury present. No swelling, tenderness or deformity. Normal range of motion.      Cervical back: Normal range of motion and neck supple.   Skin:     General: Skin is warm and dry.   Neurological:      General: No focal deficit present.      Mental Status: He is alert and oriented to person, place, and time.   Psychiatric:         Mood and Affect: Mood normal.         Behavior: Behavior normal.           ED Course & MDM   Diagnoses as of 11/12/24 1211   Pain of right upper extremity                 No data recorded     Alleene Coma Scale Score: 15 (11/12/24 0950 : Bridgette Mcdermott, RN)                           Medical Decision Making  This is a 69 year old male who had surgery on his right arm for closed fracture of distal ends of right radius and ulna with Dr. Garcia 5 days ago. He is here today with feeling as if electricity is going through his arm. He is neuro-vascularly intact with full sensation and full ROM in his fingers.   The splint looks as if it was placed a month ago due to its dirty appearance. Ortho has been called and they will evaluate the patient in the ED.      He was seen by ortho in the ED who changed the splint into an ace wrap.   We discussed his stool concerns and that in absence of any other concerning GI symptoms, he should follow up with a primary care. Referral to PCP was placed. He was in agreement with the plan of care and was discharged.    Procedure  Procedures     Demetrice Singh, MAGDALENA-CNP, DNP  11/12/24 1216

## 2024-11-15 ENCOUNTER — OFFICE VISIT (OUTPATIENT)
Dept: PRIMARY CARE | Facility: CLINIC | Age: 69
End: 2024-11-15
Payer: MEDICARE

## 2024-11-15 ENCOUNTER — LAB (OUTPATIENT)
Dept: LAB | Facility: LAB | Age: 69
End: 2024-11-15
Payer: MEDICARE

## 2024-11-15 VITALS
OXYGEN SATURATION: 99 % | WEIGHT: 217.4 LBS | TEMPERATURE: 97.6 F | HEART RATE: 62 BPM | BODY MASS INDEX: 32.2 KG/M2 | HEIGHT: 69 IN | SYSTOLIC BLOOD PRESSURE: 116 MMHG | DIASTOLIC BLOOD PRESSURE: 75 MMHG

## 2024-11-15 DIAGNOSIS — S52.501A CLOSED FRACTURE OF LOWER END OF RADIUS WITH ULNA, RIGHT, INITIAL ENCOUNTER: ICD-10-CM

## 2024-11-15 DIAGNOSIS — D17.0 LIPOMA OF NECK: ICD-10-CM

## 2024-11-15 DIAGNOSIS — Z11.59 NEED FOR HEPATITIS C SCREENING TEST: ICD-10-CM

## 2024-11-15 DIAGNOSIS — Z12.5 ENCOUNTER FOR PROSTATE CANCER SCREENING: ICD-10-CM

## 2024-11-15 DIAGNOSIS — R73.9 HYPERGLYCEMIA: ICD-10-CM

## 2024-11-15 DIAGNOSIS — M79.601 PAIN OF RIGHT UPPER EXTREMITY: ICD-10-CM

## 2024-11-15 DIAGNOSIS — S52.601A CLOSED FRACTURE OF LOWER END OF RADIUS WITH ULNA, RIGHT, INITIAL ENCOUNTER: ICD-10-CM

## 2024-11-15 DIAGNOSIS — Z51.81 ENCOUNTER FOR MEDICATION MONITORING: ICD-10-CM

## 2024-11-15 DIAGNOSIS — E78.5 DYSLIPIDEMIA: ICD-10-CM

## 2024-11-15 DIAGNOSIS — Z11.4 SCREENING FOR HIV (HUMAN IMMUNODEFICIENCY VIRUS): ICD-10-CM

## 2024-11-15 DIAGNOSIS — Z00.00 ROUTINE GENERAL MEDICAL EXAMINATION AT A HEALTH CARE FACILITY: ICD-10-CM

## 2024-11-15 DIAGNOSIS — Z00.00 ROUTINE GENERAL MEDICAL EXAMINATION AT A HEALTH CARE FACILITY: Primary | ICD-10-CM

## 2024-11-15 LAB
ALBUMIN SERPL BCP-MCNC: 3.9 G/DL (ref 3.4–5)
ALP SERPL-CCNC: 114 U/L (ref 33–136)
ALT SERPL W P-5'-P-CCNC: 16 U/L (ref 10–52)
ANION GAP SERPL CALC-SCNC: 11 MMOL/L (ref 10–20)
AST SERPL W P-5'-P-CCNC: 14 U/L (ref 9–39)
BASOPHILS # BLD AUTO: 0.03 X10*3/UL (ref 0–0.1)
BASOPHILS NFR BLD AUTO: 0.5 %
BILIRUB SERPL-MCNC: 0.3 MG/DL (ref 0–1.2)
BUN SERPL-MCNC: 14 MG/DL (ref 6–23)
CALCIUM SERPL-MCNC: 8.6 MG/DL (ref 8.6–10.3)
CHLORIDE SERPL-SCNC: 109 MMOL/L (ref 98–107)
CHOLEST SERPL-MCNC: 133 MG/DL (ref 0–199)
CHOLESTEROL/HDL RATIO: 2.6
CO2 SERPL-SCNC: 24 MMOL/L (ref 21–32)
CREAT SERPL-MCNC: 0.55 MG/DL (ref 0.5–1.3)
EGFRCR SERPLBLD CKD-EPI 2021: >90 ML/MIN/1.73M*2
EOSINOPHIL # BLD AUTO: 0.16 X10*3/UL (ref 0–0.7)
EOSINOPHIL NFR BLD AUTO: 2.5 %
ERYTHROCYTE [DISTWIDTH] IN BLOOD BY AUTOMATED COUNT: 14.9 % (ref 11.5–14.5)
EST. AVERAGE GLUCOSE BLD GHB EST-MCNC: 120 MG/DL
GLUCOSE SERPL-MCNC: 94 MG/DL (ref 74–99)
HBA1C MFR BLD: 5.8 %
HCT VFR BLD AUTO: 41.2 % (ref 41–52)
HCV AB SER QL: NONREACTIVE
HDLC SERPL-MCNC: 51.9 MG/DL
HGB BLD-MCNC: 13.4 G/DL (ref 13.5–17.5)
HIV 1+2 AB+HIV1 P24 AG SERPL QL IA: NONREACTIVE
IMM GRANULOCYTES # BLD AUTO: 0.04 X10*3/UL (ref 0–0.7)
IMM GRANULOCYTES NFR BLD AUTO: 0.6 % (ref 0–0.9)
LDLC SERPL CALC-MCNC: 59 MG/DL
LYMPHOCYTES # BLD AUTO: 1.61 X10*3/UL (ref 1.2–4.8)
LYMPHOCYTES NFR BLD AUTO: 25.1 %
MCH RBC QN AUTO: 29.1 PG (ref 26–34)
MCHC RBC AUTO-ENTMCNC: 32.5 G/DL (ref 32–36)
MCV RBC AUTO: 90 FL (ref 80–100)
MONOCYTES # BLD AUTO: 0.56 X10*3/UL (ref 0.1–1)
MONOCYTES NFR BLD AUTO: 8.7 %
NEUTROPHILS # BLD AUTO: 4.02 X10*3/UL (ref 1.2–7.7)
NEUTROPHILS NFR BLD AUTO: 62.6 %
NON HDL CHOLESTEROL: 81 MG/DL (ref 0–149)
NRBC BLD-RTO: 0 /100 WBCS (ref 0–0)
PLATELET # BLD AUTO: 333 X10*3/UL (ref 150–450)
POTASSIUM SERPL-SCNC: 4.6 MMOL/L (ref 3.5–5.3)
PROT SERPL-MCNC: 6 G/DL (ref 6.4–8.2)
PSA SERPL-MCNC: 3.16 NG/ML
RBC # BLD AUTO: 4.6 X10*6/UL (ref 4.5–5.9)
SODIUM SERPL-SCNC: 139 MMOL/L (ref 136–145)
TRIGL SERPL-MCNC: 109 MG/DL (ref 0–149)
TSH SERPL-ACNC: 1.43 MIU/L (ref 0.44–3.98)
VLDL: 22 MG/DL (ref 0–40)
WBC # BLD AUTO: 6.4 X10*3/UL (ref 4.4–11.3)

## 2024-11-15 PROCEDURE — G0472 HEP C SCREEN HIGH RISK/OTHER: HCPCS

## 2024-11-15 PROCEDURE — 1160F RVW MEDS BY RX/DR IN RCRD: CPT | Performed by: STUDENT IN AN ORGANIZED HEALTH CARE EDUCATION/TRAINING PROGRAM

## 2024-11-15 PROCEDURE — 80053 COMPREHEN METABOLIC PANEL: CPT

## 2024-11-15 PROCEDURE — 84443 ASSAY THYROID STIM HORMONE: CPT

## 2024-11-15 PROCEDURE — 1159F MED LIST DOCD IN RCRD: CPT | Performed by: STUDENT IN AN ORGANIZED HEALTH CARE EDUCATION/TRAINING PROGRAM

## 2024-11-15 PROCEDURE — 87389 HIV-1 AG W/HIV-1&-2 AB AG IA: CPT

## 2024-11-15 PROCEDURE — 1125F AMNT PAIN NOTED PAIN PRSNT: CPT | Performed by: STUDENT IN AN ORGANIZED HEALTH CARE EDUCATION/TRAINING PROGRAM

## 2024-11-15 PROCEDURE — 1170F FXNL STATUS ASSESSED: CPT | Performed by: STUDENT IN AN ORGANIZED HEALTH CARE EDUCATION/TRAINING PROGRAM

## 2024-11-15 PROCEDURE — 36415 COLL VENOUS BLD VENIPUNCTURE: CPT

## 2024-11-15 PROCEDURE — G0438 PPPS, INITIAL VISIT: HCPCS | Performed by: STUDENT IN AN ORGANIZED HEALTH CARE EDUCATION/TRAINING PROGRAM

## 2024-11-15 PROCEDURE — 80061 LIPID PANEL: CPT

## 2024-11-15 PROCEDURE — G0103 PSA SCREENING: HCPCS

## 2024-11-15 PROCEDURE — 3008F BODY MASS INDEX DOCD: CPT | Performed by: STUDENT IN AN ORGANIZED HEALTH CARE EDUCATION/TRAINING PROGRAM

## 2024-11-15 PROCEDURE — 85025 COMPLETE CBC W/AUTO DIFF WBC: CPT

## 2024-11-15 PROCEDURE — 4004F PT TOBACCO SCREEN RCVD TLK: CPT | Performed by: STUDENT IN AN ORGANIZED HEALTH CARE EDUCATION/TRAINING PROGRAM

## 2024-11-15 PROCEDURE — 83036 HEMOGLOBIN GLYCOSYLATED A1C: CPT

## 2024-11-15 ASSESSMENT — ENCOUNTER SYMPTOMS
DEPRESSION: 0
LOSS OF SENSATION IN FEET: 0
OCCASIONAL FEELINGS OF UNSTEADINESS: 0

## 2024-11-15 ASSESSMENT — PATIENT HEALTH QUESTIONNAIRE - PHQ9
2. FEELING DOWN, DEPRESSED OR HOPELESS: NOT AT ALL
2. FEELING DOWN, DEPRESSED OR HOPELESS: NOT AT ALL
SUM OF ALL RESPONSES TO PHQ9 QUESTIONS 1 AND 2: 0
SUM OF ALL RESPONSES TO PHQ9 QUESTIONS 1 AND 2: 0
1. LITTLE INTEREST OR PLEASURE IN DOING THINGS: NOT AT ALL
1. LITTLE INTEREST OR PLEASURE IN DOING THINGS: NOT AT ALL

## 2024-11-15 ASSESSMENT — ACTIVITIES OF DAILY LIVING (ADL)
MANAGING_FINANCES: INDEPENDENT
TAKING_MEDICATION: INDEPENDENT
DRESSING: INDEPENDENT
DOING_HOUSEWORK: INDEPENDENT
BATHING: INDEPENDENT
GROCERY_SHOPPING: INDEPENDENT

## 2024-11-15 ASSESSMENT — PAIN SCALES - GENERAL: PAINLEVEL_OUTOF10: 2

## 2024-11-15 NOTE — PATIENT INSTRUCTIONS

## 2024-11-15 NOTE — PROGRESS NOTES
"Subjective   Reason for Visit: Grover Bauer is an 69 y.o. male here for a Medicare Wellness visit.         Past Medical, Surgical, and Family History reviewed and updated in chart.    Reviewed all medications by prescribing practitioner or clinical pharmacist (such as prescriptions, OTCs, herbal therapies and supplements) and documented in the medical record.      Patient Care Team:  Sarah Pagan MD as PCP - General (Family Medicine)         Objective   Vitals:  /75 (BP Location: Left arm, Patient Position: Sitting)   Pulse 62   Temp 36.4 °C (97.6 °F)   Ht 1.753 m (5' 9\")   Wt 98.6 kg (217 lb 6.4 oz)   SpO2 99%   BMI 32.10 kg/m²       Physical Exam  Vitals reviewed.   Constitutional:       General: He is not in acute distress.     Appearance: He is obese.   HENT:      Head: Normocephalic.      Right Ear: External ear normal.      Left Ear: External ear normal.      Nose: No rhinorrhea.      Mouth/Throat:      Mouth: Mucous membranes are moist.   Eyes:      Conjunctiva/sclera: Conjunctivae normal.   Cardiovascular:      Rate and Rhythm: Normal rate and regular rhythm.      Heart sounds: No murmur heard.     No friction rub. No gallop.   Pulmonary:      Effort: No respiratory distress.      Breath sounds: No wheezing, rhonchi or rales.   Abdominal:      General: Bowel sounds are normal. There is no distension.      Palpations: Abdomen is soft.      Tenderness: There is no abdominal tenderness.   Musculoskeletal:      Cervical back: Neck supple.      Right lower leg: No edema.      Left lower leg: No edema.      Comments: Sutures of right arm. Well-healing. No erythema or discharge.    Skin:     General: Skin is warm and dry.      Capillary Refill: Capillary refill takes less than 2 seconds.      Comments: ~4cm round, well-circumscribed, flesh-colored, non-tender, solid mass of back of neck.    Neurological:      Mental Status: He is alert.      Gait: Gait normal.         Problem List Items " Addressed This Visit    None  Visit Diagnoses       Closed fracture of lower end of radius with ulna, right, initial encounter        Pain of right upper extremity                       Medical decision making during this visit had more complexity inherent to evaluation and management associated with medical care services that serve as the continuing focal point for all needed health care services and/or with medical care services that are part of ongoing care related to a patient's single, serious condition or a complex condition. Patient was identified as a fall risk. Risk prevention instructions provided.

## 2024-11-26 ENCOUNTER — HOSPITAL ENCOUNTER (OUTPATIENT)
Dept: RADIOLOGY | Facility: HOSPITAL | Age: 69
Discharge: HOME | End: 2024-11-26
Payer: MEDICARE

## 2024-11-26 ENCOUNTER — OFFICE VISIT (OUTPATIENT)
Dept: ORTHOPEDIC SURGERY | Facility: HOSPITAL | Age: 69
End: 2024-11-26
Payer: MEDICARE

## 2024-11-26 DIAGNOSIS — S52.501A CLOSED FRACTURE OF RIGHT DISTAL RADIUS AND ULNA, INITIAL ENCOUNTER: ICD-10-CM

## 2024-11-26 DIAGNOSIS — S52.601A CLOSED FRACTURE OF LOWER END OF RADIUS WITH ULNA, RIGHT, INITIAL ENCOUNTER: ICD-10-CM

## 2024-11-26 DIAGNOSIS — S52.501A CLOSED FRACTURE OF LOWER END OF RADIUS WITH ULNA, RIGHT, INITIAL ENCOUNTER: ICD-10-CM

## 2024-11-26 DIAGNOSIS — S52.601A CLOSED FRACTURE OF RIGHT DISTAL RADIUS AND ULNA, INITIAL ENCOUNTER: Primary | ICD-10-CM

## 2024-11-26 DIAGNOSIS — S52.501A CLOSED FRACTURE OF RIGHT DISTAL RADIUS AND ULNA, INITIAL ENCOUNTER: Primary | ICD-10-CM

## 2024-11-26 DIAGNOSIS — S52.601A CLOSED FRACTURE OF RIGHT DISTAL RADIUS AND ULNA, INITIAL ENCOUNTER: ICD-10-CM

## 2024-11-26 PROCEDURE — 73070 X-RAY EXAM OF ELBOW: CPT | Mod: RIGHT SIDE | Performed by: STUDENT IN AN ORGANIZED HEALTH CARE EDUCATION/TRAINING PROGRAM

## 2024-11-26 PROCEDURE — 99211 OFF/OP EST MAY X REQ PHY/QHP: CPT | Performed by: PHYSICIAN ASSISTANT

## 2024-11-26 PROCEDURE — 73090 X-RAY EXAM OF FOREARM: CPT | Mod: RIGHT SIDE | Performed by: STUDENT IN AN ORGANIZED HEALTH CARE EDUCATION/TRAINING PROGRAM

## 2024-11-26 PROCEDURE — 73090 X-RAY EXAM OF FOREARM: CPT | Mod: RT

## 2024-11-26 PROCEDURE — 73070 X-RAY EXAM OF ELBOW: CPT | Mod: RT

## 2024-11-26 PROCEDURE — L3984 UPPER EXT FX ORTHOSIS WRIST: HCPCS | Performed by: PHYSICIAN ASSISTANT

## 2024-11-26 RX ORDER — OXYCODONE HYDROCHLORIDE 5 MG/1
5 TABLET ORAL EVERY 6 HOURS PRN
Qty: 28 TABLET | Refills: 0 | Status: SHIPPED | OUTPATIENT
Start: 2024-11-26 | End: 2024-12-03

## 2024-12-05 PROBLEM — H52.4 PRESBYOPIA: Status: ACTIVE | Noted: 2024-12-05

## 2024-12-05 PROBLEM — N45.1 EPIDIDYMITIS: Status: ACTIVE | Noted: 2024-12-05

## 2024-12-05 PROBLEM — S81.819A LACERATION OF LOWER EXTREMITY: Status: ACTIVE | Noted: 2024-12-05

## 2024-12-13 ENCOUNTER — APPOINTMENT (OUTPATIENT)
Dept: PRIMARY CARE | Facility: CLINIC | Age: 69
End: 2024-12-13
Payer: MEDICARE

## 2024-12-30 ENCOUNTER — HOSPITAL ENCOUNTER (OUTPATIENT)
Dept: RADIOLOGY | Facility: HOSPITAL | Age: 69
Discharge: HOME | End: 2024-12-30
Payer: MEDICARE

## 2024-12-30 ENCOUNTER — OFFICE VISIT (OUTPATIENT)
Dept: ORTHOPEDIC SURGERY | Facility: HOSPITAL | Age: 69
End: 2024-12-30
Payer: MEDICARE

## 2024-12-30 DIAGNOSIS — S52.501A CLOSED FRACTURE OF RIGHT DISTAL RADIUS AND ULNA, INITIAL ENCOUNTER: ICD-10-CM

## 2024-12-30 DIAGNOSIS — S52.601A CLOSED FRACTURE OF RIGHT DISTAL RADIUS AND ULNA, INITIAL ENCOUNTER: Primary | ICD-10-CM

## 2024-12-30 DIAGNOSIS — S52.601A CLOSED FRACTURE OF RIGHT DISTAL RADIUS AND ULNA, INITIAL ENCOUNTER: ICD-10-CM

## 2024-12-30 DIAGNOSIS — S52.501A CLOSED FRACTURE OF RIGHT DISTAL RADIUS AND ULNA, INITIAL ENCOUNTER: Primary | ICD-10-CM

## 2024-12-30 PROCEDURE — 73090 X-RAY EXAM OF FOREARM: CPT | Mod: RT

## 2024-12-30 PROCEDURE — 73090 X-RAY EXAM OF FOREARM: CPT | Mod: RIGHT SIDE | Performed by: RADIOLOGY

## 2024-12-30 PROCEDURE — 99211 OFF/OP EST MAY X REQ PHY/QHP: CPT | Performed by: PHYSICIAN ASSISTANT

## 2024-12-30 RX ORDER — LIDOCAINE 40 MG/G
CREAM TOPICAL 4 TIMES DAILY PRN
Qty: 25 G | Refills: 1 | Status: SHIPPED | OUTPATIENT
Start: 2024-12-30 | End: 2025-12-30

## 2024-12-30 ASSESSMENT — PAIN - FUNCTIONAL ASSESSMENT: PAIN_FUNCTIONAL_ASSESSMENT: 0-10

## 2024-12-30 ASSESSMENT — PAIN DESCRIPTION - DESCRIPTORS: DESCRIPTORS: ACHING;THROBBING

## 2024-12-30 ASSESSMENT — PAIN SCALES - GENERAL: PAINLEVEL_OUTOF10: 10 - WORST POSSIBLE PAIN

## 2024-12-30 NOTE — PROGRESS NOTES
Patient is a 69 y.o. male who is 8 weeks s/p ORIF R radius and ulnar shaft fractures.  Date of surgery was 11/6/2024.  Patient continues to wear forearm brace, has been removing it for wrist and forearm motion on his own.  He has been using it for light ADLs, having some pain at the wrist only.  Patient denies fever or chills, N/T.    General: Alert and oriented x 3, NAD, respirations easy and unlabored with no audible wheezes, skin warm and dry, speech and dress appropriate for noted age, affect euthymic.       Musculoskeletal: RUE  Incisions healed  TTP over wrist along volar aspect   Compartments soft  mild swelling to upper extremity  Sensation intact to light touch  Motor intact including R/U/M/AIN/PIN nn  Palpable R pulse 2+  Wrist motion and forearm motion improving      X-ray: Images of right forearm reviewed personally by me today and reveal maintenance of alignment of radial and ulnar shaft fractures with hardware in position and interval callus forming     IMP:  Problem List Items Addressed This Visit    None  Visit Diagnoses       Closed fracture of right distal radius and ulna, initial encounter        Relevant Orders    XR forearm right 2 views            PLAN:  He will continue with the long forearm brace, keep the brace for sleeping and when he leaves the home. If he is resting, he can remove the brace. He can also remove it for showers and therapy sessions. I have placed a referral for OT, and he will call to get it scheduled.  I will see patient back in 5 weeks and I need x-rays R forearm next visit. I also e-scribed lidocaine cream for the wrist pain, use as directed. All questions were answered today.

## 2025-01-10 ENCOUNTER — APPOINTMENT (OUTPATIENT)
Dept: PHYSICAL THERAPY | Facility: HOSPITAL | Age: 70
End: 2025-01-10
Payer: MEDICARE

## (undated) DEVICE — Device

## (undated) DEVICE — MANIFOLD, 4 PORT NEPTUNE STANDARD

## (undated) DEVICE — COVER, C-ARM W/CLIPS, OEC GE

## (undated) DEVICE — BANDAGE, ELASTIC, MATRIX, SELF-CLOSURE, 3 IN X 5 YD, LF

## (undated) DEVICE — DRAPE, INCISE, ANTIMICROBIAL, IOBAN 2, LARGE, 17 X 23 IN, DISPOSABLE, STERILE

## (undated) DEVICE — DRAPE, SHEET, THREE QUARTER, FAN FOLD, 57 X 77 IN

## (undated) DEVICE — APPLICATOR, CHLORAPREP, W/ORANGE TINT, 26ML

## (undated) DEVICE — SUTURE, VICRYL, 0, 18 IN, CT-1, VIOLET

## (undated) DEVICE — COVER, CART, 45 X 27 X 48 IN, CLEAR

## (undated) DEVICE — SPONGE, LAP, XRAY DECT, 18IN X 18IN, W/LOOP, STERILE

## (undated) DEVICE — PADDING, WEBRIL, UNDERCAST, STERILE, 4 IN

## (undated) DEVICE — BANDAGE, ELASTIC, MATRIX, SELF-CLOSURE, 4 IN X 5 YD, LF

## (undated) DEVICE — BANDAGE, GAUZE, CONFORMING, KERLIX, 6 PLY, 4.5 IN X 4.1 YD

## (undated) DEVICE — BANDAGE, ESMARK, 3 IN X 9 FT, LF

## (undated) DEVICE — SUTURE, ETHILON, 2-0, FSLX 30, BLACK

## (undated) DEVICE — IRRIGATION SET, Y, LARGE BORE

## (undated) DEVICE — BIT, DRILL, QUICK-COUPLING, 2.5 X 110 MM, STAINLESS STEEL, GOLD

## (undated) DEVICE — SUTURE, VICRYL, 2-0, 27 IN, FSL, UNDYED

## (undated) DEVICE — STAPLER, SKIN PROXIMATE, 35 WIDE

## (undated) DEVICE — APPLICATOR, PREP, CHLORAPREP, W/ORANGE TINT, 10.5ML

## (undated) DEVICE — DRAPE, SHEET, U, STERI DRAPE, 47 X 51 IN, DISPOSABLE, STERILE